# Patient Record
Sex: MALE | Race: BLACK OR AFRICAN AMERICAN | NOT HISPANIC OR LATINO | Employment: UNEMPLOYED | URBAN - METROPOLITAN AREA
[De-identification: names, ages, dates, MRNs, and addresses within clinical notes are randomized per-mention and may not be internally consistent; named-entity substitution may affect disease eponyms.]

---

## 2018-07-10 ENCOUNTER — OFFICE VISIT (OUTPATIENT)
Dept: INTERNAL MEDICINE | Facility: HOSPITAL | Age: 53
End: 2018-07-10
Attending: STUDENT IN AN ORGANIZED HEALTH CARE EDUCATION/TRAINING PROGRAM
Payer: COMMERCIAL

## 2018-07-10 ENCOUNTER — HOSPITAL ENCOUNTER (OUTPATIENT)
Dept: RADIOLOGY | Facility: HOSPITAL | Age: 53
Discharge: HOME | End: 2018-07-10
Attending: STUDENT IN AN ORGANIZED HEALTH CARE EDUCATION/TRAINING PROGRAM
Payer: COMMERCIAL

## 2018-07-10 VITALS
DIASTOLIC BLOOD PRESSURE: 87 MMHG | HEIGHT: 67 IN | RESPIRATION RATE: 18 BRPM | WEIGHT: 173 LBS | BODY MASS INDEX: 27.15 KG/M2 | SYSTOLIC BLOOD PRESSURE: 131 MMHG | TEMPERATURE: 98.7 F | HEART RATE: 86 BPM | OXYGEN SATURATION: 97 %

## 2018-07-10 DIAGNOSIS — M25.571 ACUTE RIGHT ANKLE PAIN: ICD-10-CM

## 2018-07-10 DIAGNOSIS — M25.571 ACUTE RIGHT ANKLE PAIN: Primary | ICD-10-CM

## 2018-07-10 PROBLEM — H40.9 GLAUCOMA: Status: ACTIVE | Noted: 2018-07-10

## 2018-07-10 PROBLEM — I10 ESSENTIAL HYPERTENSION: Status: ACTIVE | Noted: 2018-07-10

## 2018-07-10 PROBLEM — F10.10 ALCOHOL ABUSE: Status: ACTIVE | Noted: 2018-07-10

## 2018-07-10 PROBLEM — Z00.00 HEALTHCARE MAINTENANCE: Status: ACTIVE | Noted: 2018-07-10

## 2018-07-10 PROCEDURE — 73600 X-RAY EXAM OF ANKLE: CPT | Mod: RT

## 2018-07-10 PROCEDURE — 99213 OFFICE O/P EST LOW 20 MIN: CPT | Performed by: INTERNAL MEDICINE

## 2018-07-10 RX ORDER — AMLODIPINE BESYLATE 5 MG/1
5 TABLET ORAL DAILY
COMMUNITY
Start: 2016-11-15 | End: 2018-07-10 | Stop reason: ALTCHOICE

## 2018-07-10 NOTE — PROGRESS NOTES
"MS4 Notes:    CC: R ankle pain    HPI: The patient is a 52 year old male with a medical history of EtOH abuse and glaucoma presenting today with a 2 week history of R ankle pain and swelling.  The patient admits to rolling his ankle while walking on uneven ground followed by immediate pain and swelling.  The patient describes the pain as initially 10/10, decreasing in intensity as time passed.  The swelling has also decreased over the past couple of weeks.  He has tried rest/ice/elevation and epson salt baths with some relief but still admits to pain and inability to bear weight on the joint. He has not tried any pain medication.    PMHx: EtOH abuse, glaucoma  PSHx: none  Meds: none  NKDA  FHx: none disclosed   SocHx: - cigarette smoking; + EtOH use \"around 40ounces of beer per day\"; intermittent marajuana use    ROS: see HPI otherwise negative    PE: GEN: NAD  Cv: RRR, normal S1, S2; no murmurs rubs or ex sounds  PULM: CTAB  MSK: Swelling and tenderness of the R ankle most prominent in the area of the lateral malleolus; decreased ROM compared to L ankle.  Significantly limited ability to bear weight.  No warmth, erythema.    Assessment/ Plan:  The patient is a 52 year old male with a medical history of EtOH abuse and glaucoma presenting with a 2 week history of R ankle pain and swelling found to have point tenderness inferior to the lateral malleolus, decreased ROM and decreased ability to bear weight without complete improvement with Rest Ice and elevation concerning for a R ankle sprain vs. Fracture.    1) Ankle pain:   -- Continue rest/ice/elevation  -- wrap with ace bandage  -- Ibuprofen PRN  -- Ankle XR to rule out fracture    "

## 2018-07-10 NOTE — PROGRESS NOTES
[facility]  Internal Medicine Progress Note       SUBJECTIVE   Rickie Kaminski is a 52 y.o. year-old male with a past medical history of HTN, alcohol abuse who presents for a same day sick visit for a complaint of     Interval History:   -Patient was last seen in the [facility] on 8/2017 for a complaint of memory loss, labs including B12, folate, TSH were normal. Today, no such complaints.     He presents to the clinic today due to a complaint of right ankle pain, swelling since a rolling his ankle while walking on uneven ground 2 weeks ago. Pain 9/10 in intensity, has been getting better since it happened however still severely limiting his ability to walk and get around. He has tried rest, ice, and elevation as well as epsom salts with mild relief. Today, he comes into the office at his wife's insistence. Denies any pain on the other foot, in leg or any other joints.     ROS otherwise unremarkable.    Patient was also advised to make regular follow up appointment so that his chronic conditions can be discussed within the next 1-2 weeks.       REVIEW OF SYSTEMS   Review of Systems   Constitutional: Negative for activity change, appetite change, chills, fatigue, fever and unexpected weight change.   HENT: Negative for congestion, ear discharge, rhinorrhea, sinus pressure and sore throat.    Eyes: Negative for discharge, itching and visual disturbance.   Respiratory: Negative for cough, chest tightness, shortness of breath and wheezing.    Cardiovascular: Negative for chest pain, palpitations and leg swelling.   Gastrointestinal: Negative for abdominal distention, abdominal pain, blood in stool, constipation, diarrhea, nausea and vomiting.   Endocrine: Negative for cold intolerance, heat intolerance, polydipsia, polyphagia and polyuria.   Genitourinary: Negative for difficulty urinating, dysuria and hematuria.   Musculoskeletal: Positive for joint swelling. Negative for myalgias.        -Pain and  "swelling of right ankle   Skin: Negative for rash.   Neurological: Negative for weakness, light-headedness and headaches.   Hematological: Does not bruise/bleed easily.   Psychiatric/Behavioral: Negative for confusion.      MEDICATIONS      No current outpatient prescriptions on file.    PHYSICAL EXAMINATION   Vital signs: /87 (BP Location: Right upper arm, Patient Position: Sitting)   Pulse 86   Temp 37.1 °C (98.7 °F) (Oral)   Resp 18   Ht 1.702 m (5' 7\")   Wt 78.5 kg (173 lb)   SpO2 97%   BMI 27.10 kg/m²   Physical Exam   Constitutional: He is oriented to person, place, and time. He appears well-developed and well-nourished. No distress.   HENT:   Head: Normocephalic and atraumatic.   Eyes: EOM are normal.   Neck: Normal range of motion. No JVD present.   Cardiovascular: Normal rate, regular rhythm, normal heart sounds and intact distal pulses.  Exam reveals no gallop and no friction rub.    No murmur heard.  Pulmonary/Chest: Effort normal and breath sounds normal. No respiratory distress. He has no wheezes. He has no rales.   Abdominal: Soft. Bowel sounds are normal. He exhibits no distension. There is no tenderness.   Musculoskeletal: Normal range of motion. He exhibits no edema.   -Tender, erythematous right ankle, tenderness over medial and lateral malleolus. No skin breaks.   -Limited passive and active range of motion.    Neurological: He is alert and oriented to person, place, and time.   Skin: Skin is warm and dry.   Psychiatric: He has a normal mood and affect. His behavior is normal.        LABS / IMAGING/STUDIES      Labs Reviewed: done    Studies/Imaging Reviewed: done    ASSESSMENT AND PLAN      Problem List     Acute right ankle pain - Primary    Current Assessment & Plan     Patient presenting with a 2 week history of right ankle pain and swelling after rolling the foot over uneven ground.   -Marked tenderness, erythema, and swelling over ankle and particularly around malleoli with " limited passive and active range of motion.  -Advised patient on rest/ice/compression/elevation.   -Placed in ace bandage in office  -Will obtain x-ray to r/o fracture, in which case he will require a boot.   -Can take ibuprofen prn for pain, advised to take it with food.         Relevant Orders    X-RAY ANKLE RIGHT 2 VIEWS (Completed)           Kevin Pepper MD  07/11/18  3:07 PM

## 2018-07-10 NOTE — PATIENT INSTRUCTIONS
Dear Mr. Kaminski,     In clinic today, we discussed your right foot pain and swelling. You likely have a sprain in your ankle. However, since it has been present for 2 weeks and not improving, we would like to rule out an x-ray. Please get this done today. As well, you can take ibuprofen as needed for pain.    We wish you the best of health!    Sincerely,  [facility]

## 2018-07-10 NOTE — ASSESSMENT & PLAN NOTE
- screening colonoscopy 3/15/2017, 2 5x8mm rectosigmoid polyps resected (path: sessile serated polyp w/o dysplasia  and hyperplastic polyp) and multiple 2-4mm rectal polyps resected (path: fragments of hyperplastic polyps); needs  repeat c-scope in 5 years  - UTD with vaccinations  - was given script for TSH, A1c, FLP but these were not done  - denies current cigarette smoking but does admit to some marijuana use

## 2018-07-11 ENCOUNTER — TELEPHONE (OUTPATIENT)
Dept: INTERNAL MEDICINE | Facility: HOSPITAL | Age: 53
End: 2018-07-11

## 2018-07-11 ASSESSMENT — ENCOUNTER SYMPTOMS
CONFUSION: 0
CHEST TIGHTNESS: 0
WEAKNESS: 0
POLYDIPSIA: 0
MYALGIAS: 0
CHILLS: 0
LIGHT-HEADEDNESS: 0
PALPITATIONS: 0
CONSTIPATION: 0
NAUSEA: 0
ABDOMINAL PAIN: 0
COUGH: 0
BRUISES/BLEEDS EASILY: 0
POLYPHAGIA: 0
DIFFICULTY URINATING: 0
EYE DISCHARGE: 0
HEADACHES: 0
ABDOMINAL DISTENTION: 0
BLOOD IN STOOL: 0
WHEEZING: 0
JOINT SWELLING: 1
DYSURIA: 0
RHINORRHEA: 0
SHORTNESS OF BREATH: 0
FATIGUE: 0
SORE THROAT: 0
DIARRHEA: 0
HEMATURIA: 0
FEVER: 0
VOMITING: 0
APPETITE CHANGE: 0
SINUS PRESSURE: 0
ACTIVITY CHANGE: 0
EYE ITCHING: 0
UNEXPECTED WEIGHT CHANGE: 0

## 2018-07-11 NOTE — TELEPHONE ENCOUNTER
Called patient 3 times this afternoon to discuss results of ankle x-ray yesterday which now reveals an avulsion fracture of medial malleolus. Would like to place patient in a boot, and have him follow up in 3 weeks to monitor for resolution.     Left voicemail with instructions to call back so that this can be arranged. Will await call back and attempt to call again tomorrow.

## 2018-07-11 NOTE — ASSESSMENT & PLAN NOTE
Patient presenting with a 2 week history of right ankle pain and swelling after rolling the foot over uneven ground.   -Marked tenderness, erythema, and swelling over ankle and particularly around malleoli with limited passive and active range of motion.  -Advised patient on rest/ice/compression/elevation.   -Placed in ace bandage in office  -Will obtain x-ray to r/o fracture, in which case he will require a boot.   -Can take ibuprofen prn for pain, advised to take it with food.

## 2018-07-12 ENCOUNTER — TELEPHONE (OUTPATIENT)
Dept: INTERNAL MEDICINE | Facility: HOSPITAL | Age: 53
End: 2018-07-12

## 2018-07-12 DIAGNOSIS — S82.54XD CLOSED NONDISPLACED FRACTURE OF MEDIAL MALLEOLUS OF RIGHT TIBIA WITH ROUTINE HEALING: Primary | ICD-10-CM

## 2018-07-12 NOTE — TELEPHONE ENCOUNTER
Pt came in for ankle brace as recommended by Dr. Pepper. I evaluated him briefly in the office. Neurovascularly intact. TTP over medial malleolus. Mild swelling. Tried to fit short CAM boot, unable to bear weight. Advised orthopedic follow up, remain NWB for now. Online referral made to [facility].     Rebecca Blakely MD  2:50 PM

## 2018-07-24 NOTE — PROGRESS NOTES
I performed a history and physical examination of the patient and discussed the management with the Resident. I reviewed the Resident's note and agree with the documented findings and plan of care, except for my comments below or within the additional notes today.Recent right ankle injury, did not seek attention until now. Is tender to palpation. Likely sprain but check xray to rule out fracture.

## 2018-08-15 ENCOUNTER — OFFICE VISIT (OUTPATIENT)
Dept: INTERNAL MEDICINE | Facility: HOSPITAL | Age: 53
End: 2018-08-15
Attending: STUDENT IN AN ORGANIZED HEALTH CARE EDUCATION/TRAINING PROGRAM
Payer: COMMERCIAL

## 2018-08-15 VITALS
RESPIRATION RATE: 18 BRPM | TEMPERATURE: 97.7 F | HEIGHT: 67 IN | SYSTOLIC BLOOD PRESSURE: 136 MMHG | DIASTOLIC BLOOD PRESSURE: 93 MMHG | OXYGEN SATURATION: 98 % | WEIGHT: 170 LBS | BODY MASS INDEX: 26.68 KG/M2 | HEART RATE: 89 BPM

## 2018-08-15 DIAGNOSIS — I10 ESSENTIAL HYPERTENSION: Primary | ICD-10-CM

## 2018-08-15 DIAGNOSIS — Z00.00 HEALTHCARE MAINTENANCE: ICD-10-CM

## 2018-08-15 DIAGNOSIS — F10.10 ALCOHOL ABUSE: ICD-10-CM

## 2018-08-15 DIAGNOSIS — M25.571 ACUTE RIGHT ANKLE PAIN: ICD-10-CM

## 2018-08-15 DIAGNOSIS — R73.03 PREDIABETES: ICD-10-CM

## 2018-08-15 PROCEDURE — 99213 OFFICE O/P EST LOW 20 MIN: CPT | Performed by: FAMILY MEDICINE

## 2018-08-15 NOTE — PROGRESS NOTES
[facility]  Internal Medicine Progress Note       SUBJECTIVE   Rickie Kaminski is a 52 y.o. year-old male with a past medical history of HTN, alcohol abuse, glaucoma who presents for follow up.    Interval History:   - Patient was last seen in the [facility] on 7/10/2018 for acute right ankle pain which was found to be an avulsion fracture of the medial maleollus. He followed up with Orthopedic surgery at [facility] where he was placed in a boot.     - Today, he says that his foot is a lot better. He is now able to bear weight on it a little more. He is performing exercises at home.     - He complains today of occasional back spasms. They are not very bothersome to him and occur 1-2 times per week. No lower extremity weakness, numbness, tingling, no radicular pain. No red flag symptoms.     - Of note, patient has a hx of alcohol abuse, and continues to drink 1 40oz can daily of beer. He says he used to drink much more but has been trying to cut down lately. His wife, who is accompanying him to the clinic has been helping him cut down.     - ROS was unremarkable.   __  HCM:  - ASCVD: 8/2017 A1c 5.7%, 8/2017 lipid panel normal, nonsmoker  - Colonoscopy: due in 2022  - Immunizations UTD     REVIEW OF SYSTEMS   Review of Systems   Constitutional: Negative for activity change, appetite change, chills, fatigue, fever and unexpected weight change.   HENT: Negative for congestion, ear discharge, rhinorrhea, sinus pressure and sore throat.    Eyes: Negative for discharge, itching and visual disturbance.   Respiratory: Negative for cough, chest tightness, shortness of breath and wheezing.    Cardiovascular: Negative for chest pain, palpitations and leg swelling.   Gastrointestinal: Negative for abdominal distention, abdominal pain, blood in stool, constipation, diarrhea, nausea and vomiting.   Endocrine: Negative for cold intolerance, heat intolerance, polydipsia, polyphagia and polyuria.   Genitourinary: Negative  "for difficulty urinating, dysuria and hematuria.   Musculoskeletal: Negative for joint swelling and myalgias.        Improving right ankle pain   Skin: Negative for rash.   Neurological: Negative for weakness, light-headedness and headaches.   Hematological: Does not bruise/bleed easily.   Psychiatric/Behavioral: Negative for confusion.        MEDICATIONS      No current outpatient prescriptions on file.    PHYSICAL EXAMINATION   Vital signs: BP (!) 136/93   Pulse 89   Temp 36.5 °C (97.7 °F) (Oral)   Resp 18   Ht 1.702 m (5' 7\")   Wt 77.1 kg (170 lb)   SpO2 98%   BMI 26.63 kg/m²   Physical Exam   Constitutional: He is oriented to person, place, and time. He appears well-developed and well-nourished. No distress.   HENT:   Head: Normocephalic and atraumatic.   Eyes: EOM are normal.   Neck: Normal range of motion. No JVD present.   Cardiovascular: Normal rate, regular rhythm, normal heart sounds and intact distal pulses.  Exam reveals no gallop and no friction rub.    No murmur heard.  Pulmonary/Chest: Effort normal and breath sounds normal. No respiratory distress. He has no wheezes. He has no rales.   Abdominal: Soft. Bowel sounds are normal. He exhibits no distension. There is no tenderness.   Musculoskeletal: Normal range of motion. He exhibits no edema.   Neurological: He is alert and oriented to person, place, and time.   Skin: Skin is warm and dry.   Psychiatric: He has a normal mood and affect. His behavior is normal.        LABS / IMAGING/STUDIES      Labs Reviewed: done    Studies/Imaging Reviewed: done    ASSESSMENT AND PLAN      Problem List Items Addressed This Visit     Essential hypertension - Primary    Current Assessment & Plan     Patient with a hx of HTN, however lately has been normotensive without need for medications.          Alcohol abuse    Current Assessment & Plan     Patient with a hx of alcohol abuse, currently drinks 1 40oz can of beer, used to drink much more per patient.   - Per " patient and wife, they have been working on cutting down the alcohol. Patient was counseled again on trying to cut down, and he is in agreement.   - Will check CMP, CBC, and INR to ensure liver synthetic function intact and normal.          Relevant Orders    Comprehensive metabolic panel (Completed)    CBC (Completed)    Protime-INR (Completed)    Healthcare maintenance    Current Assessment & Plan     - ASCVD: 8/2017 A1c 5.7%, 8/2017 lipid panel normal, nonsmoker  - Colonoscopy: due in 2022  - Immunizations UTD         Acute right ankle pain    Current Assessment & Plan     Patient had avulsion fracture of medial malleolus in 7/2018, followed with Orthopedic surgery at [facility].   - He was in a CAM boot for a few weeks, and says he is now doing much better. He is able to bear weight on his foot and able to walk around without a cane. He is continuing his exercises at home.          Prediabetes    Current Assessment & Plan     Patient with prediabetes with A1c 5.7% in 8/2017  - Will recheck A1c today.  - Patient counseled on importance of diet and exercise.          Relevant Orders    Hemoglobin A1c (Completed)           Kevin Pepper MD  08/16/18  8:59 AM

## 2018-08-15 NOTE — PATIENT INSTRUCTIONS
Dear Mr. Kaminski,     In clinic today, we discussed your ankle, which is now improving and you are able to bear weight on it. Please continue to monitor it for improvement and do exercises. As well, we discussed cutting down on alcohol. Please implement this. Please also have the blood work done today to ensure that the alcohol is not damaging your liver.     We wish you the best of health!    Sincerely,  [facility]

## 2018-08-16 LAB
ALBUMIN SERPL-MCNC: 4.1 G/DL (ref 3.5–5.5)
ALBUMIN/GLOB SERPL: 1.1 {RATIO} (ref 1.2–2.2)
ALP SERPL-CCNC: 89 IU/L (ref 39–117)
ALT SERPL-CCNC: 17 IU/L (ref 0–44)
AST SERPL-CCNC: 28 IU/L (ref 0–40)
BILIRUB SERPL-MCNC: 0.4 MG/DL (ref 0–1.2)
BUN SERPL-MCNC: 5 MG/DL (ref 6–24)
BUN/CREAT SERPL: 4 (ref 9–20)
CALCIUM SERPL-MCNC: 9.3 MG/DL (ref 8.7–10.2)
CHLORIDE SERPL-SCNC: 99 MMOL/L (ref 96–106)
CO2 SERPL-SCNC: 25 MMOL/L (ref 20–29)
CREAT SERPL-MCNC: 1.13 MG/DL (ref 0.76–1.27)
ERYTHROCYTE [DISTWIDTH] IN BLOOD BY AUTOMATED COUNT: 15.7 % (ref 12.3–15.4)
GLOBULIN SER CALC-MCNC: 3.8 G/DL (ref 1.5–4.5)
GLUCOSE SERPL-MCNC: 90 MG/DL (ref 65–99)
HBA1C MFR BLD: 5.4 % (ref 4.8–5.6)
HCT VFR BLD AUTO: 44.6 % (ref 37.5–51)
HGB BLD-MCNC: 14.7 G/DL (ref 13–17.7)
INR PPP: 1.1 (ref 0.8–1.2)
LAB CORP EGFR IF AFRICN AM: 85 ML/MIN/1.73
LAB CORP EGFR IF NONAFRICN AM: 73 ML/MIN/1.73
MCH RBC QN AUTO: 23.8 PG (ref 26.6–33)
MCHC RBC AUTO-ENTMCNC: 33 G/DL (ref 31.5–35.7)
MCV RBC AUTO: 72 FL (ref 79–97)
PLATELET # BLD AUTO: 245 X10E3/UL (ref 150–379)
POTASSIUM SERPL-SCNC: 4.3 MMOL/L (ref 3.5–5.2)
PROT SERPL-MCNC: 7.9 G/DL (ref 6–8.5)
PROTHROMBIN TIME: 11.2 SEC (ref 9.1–12)
RBC # BLD AUTO: 6.17 X10E6/UL (ref 4.14–5.8)
SODIUM SERPL-SCNC: 141 MMOL/L (ref 134–144)
WBC # BLD AUTO: 4.1 X10E3/UL (ref 3.4–10.8)

## 2018-08-16 ASSESSMENT — ENCOUNTER SYMPTOMS
SHORTNESS OF BREATH: 0
ACTIVITY CHANGE: 0
CHEST TIGHTNESS: 0
EYE DISCHARGE: 0
MYALGIAS: 0
RHINORRHEA: 0
CONSTIPATION: 0
SORE THROAT: 0
EYE ITCHING: 0
ABDOMINAL PAIN: 0
POLYDIPSIA: 0
VOMITING: 0
SINUS PRESSURE: 0
CONFUSION: 0
FATIGUE: 0
APPETITE CHANGE: 0
HEADACHES: 0
JOINT SWELLING: 0
DYSURIA: 0
CHILLS: 0
NAUSEA: 0
COUGH: 0
UNEXPECTED WEIGHT CHANGE: 0
DIARRHEA: 0
BRUISES/BLEEDS EASILY: 0
BLOOD IN STOOL: 0
LIGHT-HEADEDNESS: 0
HEMATURIA: 0
ABDOMINAL DISTENTION: 0
WEAKNESS: 0
PALPITATIONS: 0
FEVER: 0
DIFFICULTY URINATING: 0
POLYPHAGIA: 0
WHEEZING: 0

## 2018-08-16 NOTE — ASSESSMENT & PLAN NOTE
Patient with prediabetes with A1c 5.7% in 8/2017  - Will recheck A1c today.  - Patient counseled on importance of diet and exercise.

## 2018-08-16 NOTE — ASSESSMENT & PLAN NOTE
- ASCVD: 8/2017 A1c 5.7%, 8/2017 lipid panel normal, nonsmoker  - Colonoscopy: due in 2022  - Immunizations UTD

## 2018-08-16 NOTE — ASSESSMENT & PLAN NOTE
Patient had avulsion fracture of medial malleolus in 7/2018, followed with Orthopedic surgery at [facility].   - He was in a CAM boot for a few weeks, and says he is now doing much better. He is able to bear weight on his foot and able to walk around without a cane. He is continuing his exercises at home.

## 2018-08-16 NOTE — PROGRESS NOTES
I have discussed the patient's care with the Resident. I have reviewed the patient's history and Resident's findings on exam, the patient's diagnosis/differential diagnosis and treatment plan. I concur with the treatment plan as documented by the Resident, except for my comments below or within additional notes today.

## 2018-08-16 NOTE — ASSESSMENT & PLAN NOTE
Patient with a hx of alcohol abuse, currently drinks 1 40oz can of beer, used to drink much more per patient.   - Per patient and wife, they have been working on cutting down the alcohol. Patient was counseled again on trying to cut down, and he is in agreement.   - Will check CMP, CBC, and INR to ensure liver synthetic function intact and normal.

## 2020-01-17 ENCOUNTER — OFFICE VISIT (OUTPATIENT)
Dept: INTERNAL MEDICINE | Facility: HOSPITAL | Age: 55
End: 2020-01-17
Attending: STUDENT IN AN ORGANIZED HEALTH CARE EDUCATION/TRAINING PROGRAM
Payer: COMMERCIAL

## 2020-01-17 VITALS
DIASTOLIC BLOOD PRESSURE: 65 MMHG | HEIGHT: 67 IN | BODY MASS INDEX: 25.11 KG/M2 | TEMPERATURE: 98 F | OXYGEN SATURATION: 97 % | SYSTOLIC BLOOD PRESSURE: 115 MMHG | WEIGHT: 160 LBS | HEART RATE: 97 BPM

## 2020-01-17 DIAGNOSIS — I10 ESSENTIAL HYPERTENSION: Primary | ICD-10-CM

## 2020-01-17 DIAGNOSIS — H40.9 GLAUCOMA, UNSPECIFIED GLAUCOMA TYPE, UNSPECIFIED LATERALITY: ICD-10-CM

## 2020-01-17 DIAGNOSIS — F10.10 ALCOHOL ABUSE: ICD-10-CM

## 2020-01-17 PROCEDURE — 99213 OFFICE O/P EST LOW 20 MIN: CPT | Performed by: STUDENT IN AN ORGANIZED HEALTH CARE EDUCATION/TRAINING PROGRAM

## 2020-01-17 RX ORDER — FOLIC ACID 1 MG/1
1 TABLET ORAL DAILY
Qty: 90 TABLET | Refills: 1 | Status: SHIPPED | OUTPATIENT
Start: 2020-01-17 | End: 2020-10-20 | Stop reason: SDUPTHER

## 2020-01-17 RX ORDER — LANOLIN ALCOHOL/MO/W.PET/CERES
100 CREAM (GRAM) TOPICAL DAILY
Qty: 90 TABLET | Refills: 3 | Status: SHIPPED | OUTPATIENT
Start: 2020-01-17 | End: 2020-10-20 | Stop reason: SDUPTHER

## 2020-01-17 ASSESSMENT — ENCOUNTER SYMPTOMS
DIARRHEA: 0
CHILLS: 0
EYE ITCHING: 0
POLYPHAGIA: 0
SINUS PRESSURE: 0
EYE DISCHARGE: 0
COUGH: 0
VOMITING: 0
POLYDIPSIA: 0
CONFUSION: 0
WHEEZING: 0
NAUSEA: 0
BRUISES/BLEEDS EASILY: 0
HEMATURIA: 0
SHORTNESS OF BREATH: 0
LIGHT-HEADEDNESS: 0
FATIGUE: 0
PALPITATIONS: 0
MYALGIAS: 0
BLOOD IN STOOL: 0
RHINORRHEA: 0
ABDOMINAL DISTENTION: 0
WEAKNESS: 0
FEVER: 0
APPETITE CHANGE: 0
SORE THROAT: 0
ABDOMINAL PAIN: 0
DIFFICULTY URINATING: 0
UNEXPECTED WEIGHT CHANGE: 0
HEADACHES: 0
JOINT SWELLING: 0
ACTIVITY CHANGE: 0
CHEST TIGHTNESS: 0
DYSURIA: 0
CONSTIPATION: 0

## 2020-01-17 ASSESSMENT — PAIN SCALES - GENERAL: PAINLEVEL: 0-NO PAIN

## 2020-01-17 NOTE — PROGRESS NOTES
[facility]  Internal Medicine Progress Note       SUBJECTIVE   Rickie Kaminski is a 54 y.o. year-old male with a past medical history of alcohol abuse with a history of being in rehabilitation 2011, glaucoma who presents for follow up.    Patient was last seen in the [facility] on 8/15/2018.  He has been doing well since that time.  He reports no complaints today.  He is accompanied to the office by his wife who provides additional history.    Most of the visit was spent discussing the patient's alcohol use.  Patient has a long history of alcohol abuse and has been in rehab twice in the past with the last time being in 2011.  He used to drink up to half gallon of vodka a day per patient report.  He has now cut down to 1-2 40 ounce cans of beer daily.  He is 3 out of 4 positive on the CAGE questionnaire.  He does want to quit however is not interested in resources by us at the time.  He will look into going to Alcoholics Anonymous meetings near his home.  __  HCM:  - ASCVD: 8/2017 A1c 5.7%, 8/2017 lipid panel normal, nonsmoker  - Colonoscopy: due in 2022  - Immunizations: UTD     REVIEW OF SYSTEMS   Review of Systems   Constitutional: Negative for activity change, appetite change, chills, fatigue, fever and unexpected weight change.   HENT: Negative for congestion, ear discharge, rhinorrhea, sinus pressure and sore throat.    Eyes: Negative for discharge, itching and visual disturbance.   Respiratory: Negative for cough, chest tightness, shortness of breath and wheezing.    Cardiovascular: Negative for chest pain, palpitations and leg swelling.   Gastrointestinal: Negative for abdominal distention, abdominal pain, blood in stool, constipation, diarrhea, nausea and vomiting.   Endocrine: Negative for cold intolerance, heat intolerance, polydipsia, polyphagia and polyuria.   Genitourinary: Negative for difficulty urinating, dysuria and hematuria.   Musculoskeletal: Negative for joint swelling and  "myalgias.   Skin: Negative for rash.   Neurological: Negative for weakness, light-headedness and headaches.   Hematological: Does not bruise/bleed easily.   Psychiatric/Behavioral: Negative for confusion.      MEDICATIONS        Current Outpatient Medications:   •  folic acid (FOLVITE) 1 mg tablet, Take 1 tablet (1 mg total) by mouth daily., Disp: 90 tablet, Rfl: 1  •  thiamine 100 mg tablet, Take 1 tablet (100 mg total) by mouth daily., Disp: 90 tablet, Rfl: 3    PHYSICAL EXAMINATION   Vital signs:   Visit Vitals  /65 (BP Location: Right upper arm, Patient Position: Sitting)   Pulse 97   Temp 36.7 °C (98 °F) (Oral)   Ht 1.702 m (5' 7\")   Wt 72.6 kg (160 lb)   SpO2 97%   BMI 25.06 kg/m²     Physical Exam   Constitutional: He is oriented to person, place, and time. He appears well-developed and well-nourished. No distress.   HENT:   Head: Normocephalic and atraumatic.   Eyes: EOM are normal.   Neck: Normal range of motion. No JVD present.   Cardiovascular: Normal rate, regular rhythm, normal heart sounds and intact distal pulses. Exam reveals no gallop and no friction rub.   No murmur heard.  Pulmonary/Chest: Effort normal and breath sounds normal. No respiratory distress. He has no wheezes. He has no rales.   Abdominal: Soft. Bowel sounds are normal. He exhibits no distension. There is no tenderness.   Musculoskeletal: Normal range of motion. He exhibits no edema.   Neurological: He is alert and oriented to person, place, and time.   Skin: Skin is warm and dry.   Psychiatric: He has a normal mood and affect. His behavior is normal.      LABS / IMAGING/STUDIES      Labs Reviewed:   Last BMP:  Lab Results   Component Value Date     08/15/2018    K 4.3 08/15/2018    CL 99 08/15/2018    CO2 25 08/15/2018    BUN 5 (L) 08/15/2018    CREATININE 1.13 08/15/2018       Last CBC:  Lab Results   Component Value Date    WBC 4.1 08/15/2018    HGB 14.7 08/15/2018    HCT 44.6 08/15/2018    MCV 72 (L) 08/15/2018    PLT " 245 08/15/2018       Last Lipids:  Lab Results   Component Value Date    CHOL 196 08/12/2017    HDL 87 08/12/2017    LDLCALC 85 08/12/2017    TRIG 122 08/12/2017       Last three HgbA1c:  Lab Results   Component Value Date    HGBA1C 5.4 08/15/2018    HGBA1C 5.7 (H) 08/12/2017       Last Microalbumin:  No results found for: MICROALBUR    Last TSH:  Lab Results   Component Value Date    TSH 2.060 08/12/2017       Last Vitamin D:  No results found for: [id number]    Studies/Imaging Reviewed: reviewed    ASSESSMENT AND PLAN      Problem List Items Addressed This Visit        Nervous    Alcohol abuse     History of alcohol abuse status post rehab twice, last time 2011.  Reports drinking up to half gallon of vodka daily, now drinks 1-2 40 ounce cans of beer daily.  -He is 3 out of 4 positive on the CAGE questionnaire.  - He was counseled extensively on quitting alcohol further and did express that he did want to do so however declined resources at this time.  He will look into Alcoholics Anonymous meetings near his home.  -We will obtain CMP and INR to assess liver function.         Relevant Medications    thiamine 100 mg tablet    folic acid (FOLVITE) 1 mg tablet    Other Relevant Orders    Comprehensive metabolic panel    Protime-INR    Glaucoma     Patient has a history of glaucoma and reports that he used to follow with an ophthalmologist but now needs a new referral.  His glaucoma has been stable.  -Referral provided.         Relevant Orders    Ambulatory referral to Ophthalmology       Circulatory    RESOLVED: Essential hypertension - Primary           HEALTHCARE MAINTENANCE   Health Maintenance Due   Topic Date Due   • HIV Screening  09[birth year]09/1978   • Zoster Vaccine (1 of 2) 09/09/2013          Kevin Pepper MD  01/17/20  12:03 PM

## 2020-01-17 NOTE — ASSESSMENT & PLAN NOTE
History of alcohol abuse status post rehab twice, last time 2011.  Reports drinking up to half gallon of vodka daily, now drinks 1-2 40 ounce cans of beer daily.  -He is 3 out of 4 positive on the CAGE questionnaire.  - He was counseled extensively on quitting alcohol further and did express that he did want to do so however declined resources at this time.  He will look into Alcoholics Anonymous meetings near his home.  -We will obtain CMP and INR to assess liver function.

## 2020-01-17 NOTE — ASSESSMENT & PLAN NOTE
Patient has a history of glaucoma and reports that he used to follow with an ophthalmologist but now needs a new referral.  His glaucoma has been stable.  -Referral provided.

## 2020-01-17 NOTE — PROGRESS NOTES
"     [facility]  Internal Medicine Progress Note       BRIEF ATTENDING DOCUMENTATION   Please see attestation section of note for attestation and any additional physician documentation not found here.    Type of faculty precepting: Exception     HPI: This is a 54 year old male with pmh of alcohol abuse, and glaucoma, he was seen last spring, lfts/inr etc were normal. He continues to drink 2-3 40oz of beer daily, he is 3/4 on CAGE. He does not want to speak with SW today. He \"knows all about this\".   UTD with flu from pharmacy.     AP  1. Alcohol use disorder-continues to consume alcohol, and is increasing over time which is concerning, Dr. Pepper expressed this concerned, let him know that we have a SW who can help with resources, and that we recommend going to an AA meeting to get started today. Add thiamine and folate.    2. Fatty Liver- 2015 US with fatty liver likely in setting of alcohol use, CMP/INR added today. Could consider repeat US. Hep C negative.     3.Glaucoma- continue optho f/u    4.HCM- HIV today, defer shingles until next visit. UTD otherwise.     Angel Rodriguez DO          "

## 2020-01-17 NOTE — PATIENT INSTRUCTIONS
1. Please get the lab work done today.   2. Please start taking your medications, thiamine and folate.

## 2020-01-18 LAB
ALBUMIN SERPL-MCNC: 4.3 G/DL (ref 3.5–5.5)
ALBUMIN/GLOB SERPL: 1.3 {RATIO} (ref 1.2–2.2)
ALP SERPL-CCNC: 75 IU/L (ref 39–117)
ALT SERPL-CCNC: 31 IU/L (ref 0–44)
AST SERPL-CCNC: 36 IU/L (ref 0–40)
BILIRUB SERPL-MCNC: 0.2 MG/DL (ref 0–1.2)
BUN SERPL-MCNC: 7 MG/DL (ref 6–24)
BUN/CREAT SERPL: 7 (ref 9–20)
CALCIUM SERPL-MCNC: 9.4 MG/DL (ref 8.7–10.2)
CHLORIDE SERPL-SCNC: 98 MMOL/L (ref 96–106)
CO2 SERPL-SCNC: 22 MMOL/L (ref 20–29)
CREAT SERPL-MCNC: 0.94 MG/DL (ref 0.76–1.27)
GLOBULIN SER CALC-MCNC: 3.4 G/DL (ref 1.5–4.5)
GLUCOSE SERPL-MCNC: 100 MG/DL (ref 65–99)
INR PPP: 1 (ref 0.8–1.2)
LAB CORP EGFR IF AFRICN AM: 104 ML/MIN/1.73
LAB CORP EGFR IF NONAFRICN AM: 90 ML/MIN/1.73
POTASSIUM SERPL-SCNC: 3.9 MMOL/L (ref 3.5–5.2)
PROT SERPL-MCNC: 7.7 G/DL (ref 6–8.5)
PROTHROMBIN TIME: 10.4 SEC (ref 9.1–12)
SODIUM SERPL-SCNC: 140 MMOL/L (ref 134–144)

## 2020-02-25 ENCOUNTER — TELEPHONE (OUTPATIENT)
Dept: INTERNAL MEDICINE | Facility: HOSPITAL | Age: 55
End: 2020-02-25

## 2020-02-25 NOTE — TELEPHONE ENCOUNTER
Patient's wife Ina is concerned about patient's memory loss and constant conversation repetition. Ina states this has been going on for a few years. She would like his memory issues to be addressed and a neurological check.

## 2020-10-20 ENCOUNTER — OFFICE VISIT (OUTPATIENT)
Dept: INTERNAL MEDICINE | Facility: HOSPITAL | Age: 55
End: 2020-10-20
Attending: STUDENT IN AN ORGANIZED HEALTH CARE EDUCATION/TRAINING PROGRAM
Payer: COMMERCIAL

## 2020-10-20 VITALS
WEIGHT: 168 LBS | DIASTOLIC BLOOD PRESSURE: 91 MMHG | HEIGHT: 67 IN | BODY MASS INDEX: 26.37 KG/M2 | OXYGEN SATURATION: 100 % | RESPIRATION RATE: 20 BRPM | SYSTOLIC BLOOD PRESSURE: 144 MMHG | TEMPERATURE: 98.5 F | HEART RATE: 96 BPM

## 2020-10-20 DIAGNOSIS — Z78.9 ALCOHOL USE: ICD-10-CM

## 2020-10-20 DIAGNOSIS — R73.03 PREDIABETES: Primary | ICD-10-CM

## 2020-10-20 DIAGNOSIS — Z00.00 HEALTHCARE MAINTENANCE: ICD-10-CM

## 2020-10-20 DIAGNOSIS — F10.10 ALCOHOL ABUSE: ICD-10-CM

## 2020-10-20 DIAGNOSIS — N28.1 RENAL CYST: ICD-10-CM

## 2020-10-20 DIAGNOSIS — G89.29 CHRONIC MIDLINE LOW BACK PAIN WITHOUT SCIATICA: ICD-10-CM

## 2020-10-20 DIAGNOSIS — M54.50 CHRONIC MIDLINE LOW BACK PAIN WITHOUT SCIATICA: ICD-10-CM

## 2020-10-20 DIAGNOSIS — H40.9 GLAUCOMA, UNSPECIFIED GLAUCOMA TYPE, UNSPECIFIED LATERALITY: ICD-10-CM

## 2020-10-20 PROBLEM — F10.90 ALCOHOL USE: Status: ACTIVE | Noted: 2018-07-10

## 2020-10-20 PROBLEM — R41.3 MEMORY LOSS: Status: ACTIVE | Noted: 2020-10-20

## 2020-10-20 PROBLEM — R41.3 MEMORY DIFFICULTIES: Status: RESOLVED | Noted: 2020-10-20 | Resolved: 2020-10-20

## 2020-10-20 PROBLEM — R41.3 MEMORY DIFFICULTIES: Status: ACTIVE | Noted: 2020-10-20

## 2020-10-20 PROBLEM — M25.571 ACUTE RIGHT ANKLE PAIN: Status: RESOLVED | Noted: 2018-07-10 | Resolved: 2020-10-20

## 2020-10-20 PROBLEM — R03.0 ELEVATED BLOOD PRESSURE READING: Status: ACTIVE | Noted: 2020-10-20

## 2020-10-20 PROCEDURE — 99213 OFFICE O/P EST LOW 20 MIN: CPT | Mod: GE,GC,25 | Performed by: STUDENT IN AN ORGANIZED HEALTH CARE EDUCATION/TRAINING PROGRAM

## 2020-10-20 PROCEDURE — 90686 IIV4 VACC NO PRSV 0.5 ML IM: CPT | Performed by: STUDENT IN AN ORGANIZED HEALTH CARE EDUCATION/TRAINING PROGRAM

## 2020-10-20 PROCEDURE — 3E0234Z INTRODUCTION OF SERUM, TOXOID AND VACCINE INTO MUSCLE, PERCUTANEOUS APPROACH: ICD-10-PCS | Performed by: INTERNAL MEDICINE

## 2020-10-20 PROCEDURE — 90472 IMMUNIZATION ADMIN EACH ADD: CPT | Mod: GC | Performed by: STUDENT IN AN ORGANIZED HEALTH CARE EDUCATION/TRAINING PROGRAM

## 2020-10-20 RX ORDER — LANOLIN ALCOHOL/MO/W.PET/CERES
100 CREAM (GRAM) TOPICAL DAILY
Qty: 90 TABLET | Refills: 3 | Status: SHIPPED | OUTPATIENT
Start: 2020-10-20 | End: 2021-03-18 | Stop reason: SDUPTHER

## 2020-10-20 RX ORDER — FOLIC ACID 1 MG/1
1 TABLET ORAL DAILY
Qty: 90 TABLET | Refills: 3 | Status: SHIPPED | OUTPATIENT
Start: 2020-10-20 | End: 2021-03-18 | Stop reason: SDUPTHER

## 2020-10-20 ASSESSMENT — PAIN SCALES - GENERAL: PAINLEVEL: 0-NO PAIN

## 2020-10-20 NOTE — PROGRESS NOTES
"56 yo male with hx of alcohol use and alcoholic fatty liver and glaucoma.  Last January had discussion about alchol use.      Visit Vitals  BP (!) 144/91   Pulse 96   Temp 36.9 °C (98.5 °F) (Oral)   Resp 20   Ht 1.702 m (5' 7\")   Wt 76.2 kg (168 lb)   SpO2 100%   BMI 26.31 kg/m²     /81      A/P   Alcohol Use:  4 beers a day.  This has been cut back significantly.  Had DT 5 yrs ago.  Has tried AA and is not interested in cutting back at this time.  Will give Vitamin suppliemenation    Short Term Memory Loss;  Probably related to ETOH.  Will check B12 folate, RPR    Renal Cyst: Repeat renal US to moditor the cyst    Lumbago: No red flags.  PT consult placed.     HCM: All immunizations UTD except Flu and Shingles today [place] UTD  "

## 2020-10-20 NOTE — ASSESSMENT & PLAN NOTE
Most consistent with musculoskeletal strain. No red flags. Patient can take an NSAID no more than 10 times monthly for the pain. I have give him a script for PT to help strengthen his posterior muscles.

## 2020-10-20 NOTE — PATIENT INSTRUCTIONS
Mr. Kaminski,    1. We gave you Shingles and Flu vaccine    2. I have sent over 3 vitamins to take. These get depleted when you drink alcohol frequently.    3. We will check some labs to make sure your memory issues aren't related to anything obvious.    4. Let me know if you are interested in cutting back on alcohol use    5. I have given you a referral to ophthalmology    6. We will watch your blood pressure. It is okay at this time.    We will see you back in 1 year  Dr. De La Vega

## 2020-10-20 NOTE — PROGRESS NOTES
[facility]  Internal Medicine Progress Note       SUBJECTIVE   Rickie Kaminski is a 55 y.o. year-old male with a past medical history of alcohol use c/b alcoholic fatty liver disease who presents for follow-up. He was last seen on 1/17/2020 by Dr. Pepper at which time there was a long discussion about alcohol use.  Today he presents with his wife.    Elevated blood pressure  Has had 1 previous elevated BP reading in our system on 8/5/2018 of 136/84. Has a family history. Today manual BP is 131/84. Asx    Memory  Wife has noticed that for the past few years that he has had some issues short term memory loss. She feels like it is multiple times daily. Patient never forgets where he is, who is around him, or what he is doing.    Eye  [facility] but it has been quite some time. Vision has remained steady over the past 1-2 years. They would like a referral to the eye doctor.     Back Pain  Has centrally located lower back pain that is intermittent. Estimates that it is once a month but wife estimates that it is more often. When it does occur it may happen for a couple days. No leg or buttock pain. Does not currently currently work. Does not do manual labor or lift heavy objects. Denies injuries to his back. No exacerbating factors. He does not take any medications for the pain. When the pain presents, it slows him down. Has not seen a physician before for back pain.     Weight   Mildly overweight.     Alcohol use  Drinks about a 40 a day estimated to about 4 beers daily. Wife concurs. Has a history of alcohol withdrawal 5 years ago with DTs. At that time he was drinking a quarter gallon of liquor daily.     HCM  Flu shot  Shingles vaccine  PPSV 23 2016  Colonoscopy 3/5/2017     REVIEW OF SYSTEMS   Review of Systems  As per HPI   MEDICATIONS        Current Outpatient Medications:   •  folic acid (FOLVITE) 1 mg tablet, Take 1 tablet (1 mg total)  "by mouth daily., Disp: 90 tablet, Rfl: 3  •  therapeutic multivitamin (THERAGRAN) tablet, Take 1 tablet by mouth daily., Disp: 90 tablet, Rfl: 3  •  thiamine 100 mg tablet, Take 1 tablet (100 mg total) by mouth daily., Disp: 90 tablet, Rfl: 3    PHYSICAL EXAMINATION   Vital signs:   Visit Vitals  BP (!) 144/91   Pulse 96   Temp 36.9 °C (98.5 °F) (Oral)   Resp 20   Ht 1.702 m (5' 7\")   Wt 76.2 kg (168 lb)   SpO2 100%   BMI 26.31 kg/m²     Physical Exam  Vitals signs reviewed.   Constitutional:       General: He is not in acute distress.     Appearance: He is well-developed. He is not diaphoretic.   HENT:      Head: Normocephalic and atraumatic.      Nose: Nose normal.   Eyes:      General: No scleral icterus.        Right eye: No discharge.         Left eye: No discharge.   Neck:      Musculoskeletal: Neck supple.   Cardiovascular:      Rate and Rhythm: Normal rate and regular rhythm.      Heart sounds: Normal heart sounds. No murmur. No friction rub. No gallop.    Pulmonary:      Effort: Pulmonary effort is normal. No respiratory distress.      Breath sounds: Normal breath sounds. No wheezing or rales.   Abdominal:      General: Bowel sounds are normal.      Palpations: Abdomen is soft.   Musculoskeletal: Normal range of motion.   Skin:     General: Skin is warm and dry.   Neurological:      Mental Status: He is alert and oriented to person, place, and time.   Psychiatric:         Behavior: Behavior normal.         Thought Content: Thought content normal.         Judgment: Judgment normal.          LABS / IMAGING/STUDIES      Labs Reviewed:   Last BMP:  Lab Results   Component Value Date     01/17/2020    K 3.9 01/17/2020    CL 98 01/17/2020    CO2 22 01/17/2020    BUN 7 01/17/2020    CREATININE 0.94 01/17/2020       Last CBC:  Lab Results   Component Value Date    WBC 4.1 08/15/2018    HGB 14.7 08/15/2018    HCT 44.6 08/15/2018    MCV 72 (L) 08/15/2018     08/15/2018       Last Lipids:  Lab Results "   Component Value Date    CHOL 196 08/12/2017    HDL 87 08/12/2017    LDLCALC 85 08/12/2017    TRIG 122 08/12/2017       Last three HgbA1c:  Lab Results   Component Value Date    HGBA1C 5.4 08/15/2018    HGBA1C 5.7 (H) 08/12/2017       Last Microalbumin:  No results found for: MICROALBUR    Last TSH:  Lab Results   Component Value Date    TSH 2.060 08/12/2017       Last Vitamin D:  No results found for: [id number]    Studies/Imaging Reviewed: Reviewed        ASSESSMENT AND PLAN      Problem List Items Addressed This Visit        Nervous    Glaucoma     I have given him a [facility]         Relevant Orders    Ambulatory referral to Ophthalmology    Chronic low back pain     Most consistent with musculoskeletal strain. No red flags. Patient can take an NSAID no more than 10 times monthly for the pain. I have give him a script for PT to help strengthen his posterior muscles.         Relevant Orders    Ambulatory referral to Physical Therapy       Genitourinary    Renal cyst     Incidental ~1.5cm solitary renal cyst. Repeat US for monitoring.            Endocrine/Metabolic    Prediabetes - Primary    Relevant Orders    Lipid panel    Hemoglobin A1c       Other    Alcohol use     Educated Mr. Kaminski about the negative effects of alcohol. He is not interested in reducing his intake at this time. I have let him know whenever he feels ready to do so, he can give our clinic a call for help. I have sent folate, thiamine, and MVI to his pharmacy for supplementation. Given history of AFLD, we will check a CMP         Relevant Medications    folic acid (FOLVITE) 1 mg tablet    thiamine 100 mg tablet    Other Relevant Orders    Comprehensive metabolic panel    Vitamin B12    TSH w reflex FT4    RPR    Healthcare maintenance     Patient given Shingrex and flu vaccine           Other Visit Diagnoses     Alcohol abuse        Relevant Medications    folic acid (FOLVITE) 1 mg tablet    thiamine 100 mg tablet            HEALTHCARE MAINTENANCE   Health Maintenance Due   Topic Date Due   • Varicella Vaccines (1 of 2 - 2-dose childhood series) 09/09/1964   • HIV Screening  09/09/1976   • Zoster Vaccine (1 of 2) 09/09/2013   • Influenza Vaccine (1) 08/01/2020          Rehana De La Vega MD  10/20/20  3:47 PM

## 2020-10-20 NOTE — ASSESSMENT & PLAN NOTE
Educated Mr. Kaminski about the negative effects of alcohol. He is not interested in reducing his intake at this time. I have let him know whenever he feels ready to do so, he can give our clinic a call for help. I have sent folate, thiamine, and MVI to his pharmacy for supplementation. Given history of AFLD, we will check a CMP

## 2020-10-20 NOTE — ASSESSMENT & PLAN NOTE
Wife reports short term memory issues. No long term issues and it is not interfering with his life. Potential concern to be related to alcohol use. Checking B12, TSH, RPR.

## 2020-12-08 LAB
ALBUMIN SERPL-MCNC: 4.2 G/DL (ref 3.8–4.9)
ALBUMIN/GLOB SERPL: 1.3 {RATIO} (ref 1.2–2.2)
ALP SERPL-CCNC: 69 IU/L (ref 39–117)
ALT SERPL-CCNC: 42 IU/L (ref 0–44)
AST SERPL-CCNC: 73 IU/L (ref 0–40)
BILIRUB SERPL-MCNC: 0.3 MG/DL (ref 0–1.2)
BUN SERPL-MCNC: 9 MG/DL (ref 6–24)
BUN/CREAT SERPL: 9 (ref 9–20)
CALCIUM SERPL-MCNC: 9.3 MG/DL (ref 8.7–10.2)
CHLORIDE SERPL-SCNC: 101 MMOL/L (ref 96–106)
CHOLEST SERPL-MCNC: 196 MG/DL (ref 100–199)
CO2 SERPL-SCNC: 27 MMOL/L (ref 20–29)
CREAT SERPL-MCNC: 1 MG/DL (ref 0.76–1.27)
GLOBULIN SER CALC-MCNC: 3.2 G/DL (ref 1.5–4.5)
GLUCOSE SERPL-MCNC: 112 MG/DL (ref 65–99)
HBA1C MFR BLD: 5.6 % (ref 4.8–5.6)
HDLC SERPL-MCNC: 89 MG/DL
LAB CORP EGFR IF AFRICN AM: 96 ML/MIN/1.73
LAB CORP EGFR IF NONAFRICN AM: 83 ML/MIN/1.73
LDLC SERPL CALC-MCNC: 91 MG/DL (ref 0–99)
POTASSIUM SERPL-SCNC: 4.1 MMOL/L (ref 3.5–5.2)
PROT SERPL-MCNC: 7.4 G/DL (ref 6–8.5)
RPR SER QL: NON REACTIVE
SODIUM SERPL-SCNC: 140 MMOL/L (ref 134–144)
T4 FREE SERPL-MCNC: 0.96 NG/DL (ref 0.82–1.77)
TRIGL SERPL-MCNC: 93 MG/DL (ref 0–149)
TSH SERPL DL<=0.005 MIU/L-ACNC: 2.41 UIU/ML (ref 0.45–4.5)
VIT B12 SERPL-MCNC: 229 PG/ML (ref 232–1245)
VLDLC SERPL CALC-MCNC: 16 MG/DL (ref 5–40)

## 2020-12-16 DIAGNOSIS — Z78.9 ALCOHOL USE: Primary | ICD-10-CM

## 2021-01-18 ENCOUNTER — CLINICAL SUPPORT (OUTPATIENT)
Dept: INTERNAL MEDICINE | Facility: HOSPITAL | Age: 56
End: 2021-01-18
Attending: INTERNAL MEDICINE
Payer: COMMERCIAL

## 2021-01-18 DIAGNOSIS — Z23 NEED FOR SHINGLES VACCINE: Primary | ICD-10-CM

## 2021-01-18 DIAGNOSIS — Z23 VARICELLA VACCINE: Primary | ICD-10-CM

## 2021-01-18 PROCEDURE — 90750 HZV VACC RECOMBINANT IM: CPT | Performed by: INTERNAL MEDICINE

## 2021-01-18 PROCEDURE — 3E0234Z INTRODUCTION OF SERUM, TOXOID AND VACCINE INTO MUSCLE, PERCUTANEOUS APPROACH: ICD-10-PCS | Performed by: INTERNAL MEDICINE

## 2021-03-18 ENCOUNTER — OFFICE VISIT (OUTPATIENT)
Dept: INTERNAL MEDICINE | Facility: HOSPITAL | Age: 56
End: 2021-03-18
Attending: STUDENT IN AN ORGANIZED HEALTH CARE EDUCATION/TRAINING PROGRAM
Payer: COMMERCIAL

## 2021-03-18 ENCOUNTER — PATIENT OUTREACH (OUTPATIENT)
Dept: CASE MANAGEMENT | Facility: CLINIC | Age: 56
End: 2021-03-18

## 2021-03-18 VITALS
OXYGEN SATURATION: 97 % | SYSTOLIC BLOOD PRESSURE: 139 MMHG | HEART RATE: 87 BPM | BODY MASS INDEX: 26.37 KG/M2 | HEIGHT: 67 IN | TEMPERATURE: 97.9 F | WEIGHT: 168 LBS | DIASTOLIC BLOOD PRESSURE: 79 MMHG

## 2021-03-18 DIAGNOSIS — D12.6 ADENOMATOUS POLYP OF COLON, UNSPECIFIED PART OF COLON: ICD-10-CM

## 2021-03-18 DIAGNOSIS — Z00.00 HEALTHCARE MAINTENANCE: ICD-10-CM

## 2021-03-18 DIAGNOSIS — M54.50 CHRONIC LOW BACK PAIN, UNSPECIFIED BACK PAIN LATERALITY, UNSPECIFIED WHETHER SCIATICA PRESENT: ICD-10-CM

## 2021-03-18 DIAGNOSIS — Z11.59 NEED FOR HEPATITIS C SCREENING TEST: ICD-10-CM

## 2021-03-18 DIAGNOSIS — F10.10 ALCOHOL ABUSE: ICD-10-CM

## 2021-03-18 DIAGNOSIS — Z11.4 SCREENING FOR HIV (HUMAN IMMUNODEFICIENCY VIRUS): ICD-10-CM

## 2021-03-18 DIAGNOSIS — R05.9 COUGH: ICD-10-CM

## 2021-03-18 DIAGNOSIS — H40.9 GLAUCOMA, UNSPECIFIED GLAUCOMA TYPE, UNSPECIFIED LATERALITY: Primary | ICD-10-CM

## 2021-03-18 DIAGNOSIS — Z78.9 ALCOHOL USE: ICD-10-CM

## 2021-03-18 DIAGNOSIS — G89.29 CHRONIC LOW BACK PAIN, UNSPECIFIED BACK PAIN LATERALITY, UNSPECIFIED WHETHER SCIATICA PRESENT: ICD-10-CM

## 2021-03-18 DIAGNOSIS — R41.3 MEMORY LOSS: ICD-10-CM

## 2021-03-18 DIAGNOSIS — E53.8 B12 DEFICIENCY: ICD-10-CM

## 2021-03-18 DIAGNOSIS — K76.0 FATTY LIVER: ICD-10-CM

## 2021-03-18 PROBLEM — K63.5 COLON POLYPS: Status: ACTIVE | Noted: 2021-03-18

## 2021-03-18 PROCEDURE — 99214 OFFICE O/P EST MOD 30 MIN: CPT | Performed by: FAMILY MEDICINE

## 2021-03-18 RX ORDER — GUAIFENESIN 600 MG/1
600 TABLET, EXTENDED RELEASE ORAL 2 TIMES DAILY
Qty: 20 TABLET | Refills: 0 | Status: SHIPPED | OUTPATIENT
Start: 2021-03-18 | End: 2021-03-28

## 2021-03-18 RX ORDER — FOLIC ACID 1 MG/1
1 TABLET ORAL DAILY
Qty: 90 TABLET | Refills: 3 | Status: SHIPPED | OUTPATIENT
Start: 2021-03-18 | End: 2022-01-04 | Stop reason: SDUPTHER

## 2021-03-18 RX ORDER — LANOLIN ALCOHOL/MO/W.PET/CERES
100 CREAM (GRAM) TOPICAL DAILY
Qty: 90 TABLET | Refills: 3 | Status: SHIPPED | OUTPATIENT
Start: 2021-03-18 | End: 2022-01-04 | Stop reason: SDUPTHER

## 2021-03-18 RX ORDER — LANOLIN ALCOHOL/MO/W.PET/CERES
1000 CREAM (GRAM) TOPICAL DAILY
Qty: 90 TABLET | Refills: 3 | Status: SHIPPED | OUTPATIENT
Start: 2021-03-18 | End: 2022-01-04 | Stop reason: SDUPTHER

## 2021-03-18 ASSESSMENT — ENCOUNTER SYMPTOMS
CHILLS: 0
PALPITATIONS: 0
FATIGUE: 0
FEVER: 0
ABDOMINAL DISTENTION: 0
VOMITING: 0
ABDOMINAL PAIN: 0
CONSTIPATION: 0
NAUSEA: 0
DIARRHEA: 0
CONFUSION: 0

## 2021-03-18 ASSESSMENT — PAIN SCALES - GENERAL: PAINLEVEL: 5

## 2021-03-18 ASSESSMENT — PATIENT HEALTH QUESTIONNAIRE - PHQ9: SUM OF ALL RESPONSES TO PHQ9 QUESTIONS 1 & 2: 0

## 2021-03-18 NOTE — ASSESSMENT & PLAN NOTE
Discussed  LFTs mildly elevated in 12/2020     - Recheck CMP around 6/2020  - Re-prescribing nutritional supplements

## 2021-03-18 NOTE — PROGRESS NOTES
He is having problems with short term memory and worsening neuropsychiatric issues over the last two years. He is able to complete ADLs but is significantly limited in IADLs. History of heavy alcohol use/alcohol use disorder also untreated likely b12 deficiency. H/o fatty liver and ongoing alcohol use    Get MRI brain  Referred to neurology  Check labs  Replete b12, thiamine, folate orally  US abd with elastography  Referral back to GI for f/u colonoscopy, last colonoscopy 3 yrs ago with several polyps including a sessile serrated adenoma.   Involved SW today for helping his wife with reources as she is primary care giver and has little support    Suspect he may have korsakoff's dementia     Rebecca Blakely MD

## 2021-03-18 NOTE — ASSESSMENT & PLAN NOTE
Referred to PT last visit - did not receive referral    - Will trial tylenol at home  - Will refer to PT

## 2021-03-18 NOTE — PROGRESS NOTES
"DIANA received request from Dr. Blakely to meet with Rickie and his wife, Ina, in office to discuss memory issues.     Rickie is a 54YO w/ dx that include alcohol abuse (3+ times inpatient rehab), glaucoma, B12 deficiency. He has  and [facility].    Living & Supports  Rickie lives with his wife, Ina. They don't have children. 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   Rickie was working at [employer], but left the job in 2010/2011 d/t alcohol and drug abuse. He's been inpatient and group home in [place], as well as [facility] here and another facility they can't remember in [place]. He admits that alcohol and marijuana abuse effected his ability to hold and maintain a job.    Rickie has no income. Ina attempted and he was denied SSDI in 2011. We discussed at length that he may now be eligible given his new medical issues. She is encouraged to help him apply for SSDI again after he meets w/ neuro and obtains a dx. Application info reviewed. Ina has no issues applying w/o assistance.    Current Issues  Rickie is having issues with ST memory and impaired judgement/decision making. Ina explains that she has to write a list for the grocery store. She doesn't trust he'll be able to find way home out of the city - he used to travel to [place] for [employer] shows, but she doesn't allow anymore due to an incident where he came home with clothes torn and dirty and no memory of what happened. Not sure if this is from memory issues or alcohol abuse. Rickie had difficulty remembering what he ate for breakfast. Some word finding difficulty noted, though he's a quiet man as it is.   Rickie and Ina deny any issues remembering names, faces. Deny issues w/ ADLs, incontinence. Calendars throughout the house to assist with date.  They deny any changes in behavior. Rickie notes \"the COVID " "blues,\" being isolated and quarantined.   Ina manages all finances, meals.     Recommendations  -PCP referring to [place] [facility]. We discussed the [facility] and the programs available, specifically [facility] Program. Because he's under 65, he will need referral to [facility] from [place] [facility].    -Showed Ina [email or web address].org and how to navigate site to see resources, tips for caregiver. Provided 24/7 helpline. Encouraged her to call to be connected with local alzheimer/caregiver chapter and other support systems.    -Discussed the importance of a routine, especially given current stage and Rickie's level of awareness. Discussed the benefits of his connection and ability to play music, and how this is shown to support the brain. Ina says he plays by ear, doesn't read sheet music. Encouraged her to encourage music engagement often.     -Suggested she look into [facility] Caregiver Support Program for her own assistance. We also discussed Waiver Long Term Support Services and how to apply. It will be beneficial for him to see neuro for eval and obtain dx.     We also discussed the effects of alcohol and drug use on the memory long term. Discussed the various types of neurological conditions associated with dementia, and how each look different for every person. Reiterated benefits of stopping alcohol and marijuana consumption as it will only result in further decline, as well as confusion for symptom monitoring. Ina claims there's no alcohol in the home and this behavior has stopped.    SW did not detect any relationship issues at this time and feels Rickie is well supported by Ina. Will monitor caregiver burden and stress and remain available to connect both Rickie and Ina to appropriate resources. Contact info provided.        PRIMITIVO Lopez  [phone]    "

## 2021-03-18 NOTE — PATIENT INSTRUCTIONS
Schedule MRI of the brain    Call [phone] to make an appointment with Dr. Smith at [place], a neurologist    Start taking vitamin b12, folate and thiamine    Get blood work    See gastroenterology her at [facility]    Schedule US of abdomen.

## 2021-03-18 NOTE — PROGRESS NOTES
[facility]  Internal Medicine Progress Note       SUBJECTIVE   Rickie Kaminski is a 55 y.o. year-old male with a past medical history of alcohol use c/b alcoholic fatty liver disease who presents for follow-up. He was last seen on 10/20/2020 by Dr. De La Vega.    Interval History: Patient with chronic back pain. Says he has been hunched over a lot. No warning signs. Does not take anything for pain medications.    Endorsing a cough productive of green sputum of at least one month. No chest pain or SOB. No smoking hx. Denies sick contacts, lives at home with wife.  No diarrhea, constipation. Pt had COVID shot a few days ago on 3/15.     Per wife, has been having increased issues with his memory. She states that these memory issues have been getting worse in the past year. The patient is able to perform his ADLs but he is unable to travel to doctor's appointments himself or care for himself even at the age of 55. The patient does not have a history of dementia in his family. The patient has no brain imaging on file.    Social Hx: Daily marijuana smoker, 2 joints a day for about 45 years.  No cigarette smoking, no vaping. Alcohol use is 2-3 beers a daily.      HCM: All immunizations UTD on all  Flu shot + Shingles vaccine 10/2020  PPSV 23 2016  Colonoscopy 3/5/2017     REVIEW OF SYSTEMS   Review of Systems   Constitutional: Negative for chills, fatigue and fever.   Cardiovascular: Negative for chest pain, palpitations and leg swelling.   Gastrointestinal: Negative for abdominal distention, abdominal pain, constipation, diarrhea, nausea and vomiting.   Psychiatric/Behavioral: Negative for confusion.        MEDICATIONS        Current Outpatient Medications:   •  cyanocobalamin (VITAMIN B12) 1,000 mcg tablet, Take 1 tablet (1,000 mcg total) by mouth daily., Disp: 90 tablet, Rfl: 3  •  folic acid (FOLVITE) 1 mg tablet, Take 1 tablet (1 mg total) by mouth daily., Disp: 90 tablet, Rfl: 3  •  guaiFENesin  "(MUCINEX) 600 mg 12 hr tablet, Take 1 tablet (600 mg total) by mouth 2 (two) times a day for 10 days., Disp: 20 tablet, Rfl: 0  •  therapeutic multivitamin (THERAGRAN) tablet, Take 1 tablet by mouth daily., Disp: 90 tablet, Rfl: 3  •  thiamine 100 mg tablet, Take 1 tablet (100 mg total) by mouth daily., Disp: 90 tablet, Rfl: 3    PHYSICAL EXAMINATION   Vital signs:   Visit Vitals  /79 (BP Location: Left upper arm, Patient Position: Sitting)   Pulse 87   Temp 36.6 °C (97.9 °F) (Temporal)   Ht 1.702 m (5' 7\")   Wt 76.2 kg (168 lb)   SpO2 97%   BMI 26.31 kg/m²     Physical Exam  Constitutional:       Appearance: Normal appearance.   HENT:      Mouth/Throat:      Mouth: Mucous membranes are moist.   Eyes:      Extraocular Movements: Extraocular movements intact.   Cardiovascular:      Rate and Rhythm: Normal rate and regular rhythm.   Pulmonary:      Effort: Pulmonary effort is normal.      Breath sounds: Normal breath sounds.   Abdominal:      General: There is distension.      Palpations: Abdomen is soft.   Musculoskeletal:      Right lower leg: No edema.      Left lower leg: No edema.   Skin:     General: Skin is warm and dry.   Neurological:      Mental Status: He is alert.      Cranial Nerves: No cranial nerve deficit.      Motor: No weakness.      Coordination: Coordination normal.      Deep Tendon Reflexes: Reflexes normal.      Comments: Moving all extremities  Slow to respond  AAOx2-3 Knew person, place, thought it was 2020, did not know month          LABS / IMAGING/STUDIES      Labs Reviewed:   Last BMP:  Lab Results   Component Value Date     12/07/2020    K 4.1 12/07/2020     12/07/2020    CO2 27 12/07/2020    BUN 9 12/07/2020    CREATININE 1.00 12/07/2020       Last CBC:  Lab Results   Component Value Date    WBC 4.1 08/15/2018    HGB 14.7 08/15/2018    HCT 44.6 08/15/2018    MCV 72 (L) 08/15/2018     08/15/2018       Last Lipids:  Lab Results   Component Value Date    CHOL 196 " 12/07/2020    HDL 89 12/07/2020    LDLCALC 91 12/07/2020    TRIG 93 12/07/2020       Last three HgbA1c:  Lab Results   Component Value Date    HGBA1C 5.6 12/07/2020    HGBA1C 5.4 08/15/2018    HGBA1C 5.7 (H) 08/12/2017       Last Microalbumin:  No results found for: MICROALBUR    Last TSH:  Lab Results   Component Value Date    TSH 2.410 12/07/2020       Last Vitamin D:  No results found for: [id number]    Studies/Imaging Reviewed: Reviewed        ASSESSMENT AND PLAN      Problem List Items Addressed This Visit        Nervous    Glaucoma - Primary     Referred to ophtho last visit         Chronic low back pain     Referred to PT last visit - did not receive referral    - Will trial tylenol at home  - Will refer to PT         Relevant Orders    Ambulatory referral to Physical Therapy       Respiratory    Cough     Cough productive of green sputum, no other sxs    - Will trial mucinex         Relevant Medications    guaiFENesin (MUCINEX) 600 mg 12 hr tablet       Digestive    B12 deficiency     B12 229 in 12/2020    - Prescribe B12 supplementation         Relevant Medications    cyanocobalamin (VITAMIN B12) 1,000 mcg tablet    Other Relevant Orders    CBC and differential    Methylmalonic Acid, Serum    Colon polyps    Relevant Orders    Ambulatory referral to [facility]       Other    Alcohol use     Discussed  LFTs mildly elevated in 12/2020     - Recheck CMP around 6/2020  - Re-prescribing nutritional supplements         Relevant Medications    folic acid (FOLVITE) 1 mg tablet    therapeutic multivitamin (THERAGRAN) tablet    thiamine 100 mg tablet    Other Relevant Orders    Comprehensive metabolic panel    Protime-INR    Healthcare maintenance     UTD         Memory loss     Obtain MRI  Refer to neurology         Relevant Orders    MRI BRAIN WITH AND WITHOUT CONTRAST    MRI BRAIN WITHOUT CONTRAST    Ambulatory referral to Neurology      Other Visit Diagnoses     Alcohol abuse        Relevant  Medications    folic acid (FOLVITE) 1 mg tablet    therapeutic multivitamin (THERAGRAN) tablet    thiamine 100 mg tablet    Other Relevant Orders    ULTRASOUND ABDOMEN COMPLETE W ELASTOGRAPHY    Fatty liver        Relevant Orders    ULTRASOUND ABDOMEN COMPLETE W ELASTOGRAPHY    Need for hepatitis C screening test        Relevant Orders    Hepatitis C antibody    Screening for HIV (human immunodeficiency virus)        Relevant Orders    HIV 1,2 AB P24 AG           HEALTHCARE MAINTENANCE   Health Maintenance Due   Topic Date Due   • HIV Screening  Never done   • Colonoscopy  03/15/2020          Pura Tovar MD  03/18/21  3:56 PM

## 2021-03-22 ENCOUNTER — TELEPHONE (OUTPATIENT)
Dept: INTERNAL MEDICINE | Facility: HOSPITAL | Age: 56
End: 2021-03-22

## 2021-03-24 LAB
ALBUMIN SERPL-MCNC: 4.2 G/DL (ref 3.8–4.9)
ALBUMIN/GLOB SERPL: 1.1 {RATIO} (ref 1.2–2.2)
ALP SERPL-CCNC: 82 IU/L (ref 39–117)
ALT SERPL-CCNC: 21 IU/L (ref 0–44)
AST SERPL-CCNC: 34 IU/L (ref 0–40)
BASOPHILS # BLD AUTO: 0 X10E3/UL (ref 0–0.2)
BASOPHILS NFR BLD AUTO: 1 %
BILIRUB SERPL-MCNC: 0.4 MG/DL (ref 0–1.2)
BUN SERPL-MCNC: 8 MG/DL (ref 6–24)
BUN/CREAT SERPL: 8 (ref 9–20)
CALCIUM SERPL-MCNC: 9.3 MG/DL (ref 8.7–10.2)
CHLORIDE SERPL-SCNC: 100 MMOL/L (ref 96–106)
CO2 SERPL-SCNC: 26 MMOL/L (ref 20–29)
CREAT SERPL-MCNC: 0.97 MG/DL (ref 0.76–1.27)
EOSINOPHIL # BLD AUTO: 0.1 X10E3/UL (ref 0–0.4)
EOSINOPHIL NFR BLD AUTO: 2 %
ERYTHROCYTE [DISTWIDTH] IN BLOOD BY AUTOMATED COUNT: 16.6 % (ref 11.6–15.4)
GLOBULIN SER CALC-MCNC: 3.8 G/DL (ref 1.5–4.5)
GLUCOSE SERPL-MCNC: 107 MG/DL (ref 65–99)
HCT VFR BLD AUTO: 48.4 % (ref 37.5–51)
HCV AB S/CO SERPL IA: <0.1 S/CO RATIO (ref 0–0.9)
HGB BLD-MCNC: 15.7 G/DL (ref 13–17.7)
HIV 1+2 AB+HIV1 P24 AG SERPL QL IA: NON REACTIVE
IMM GRANULOCYTES # BLD AUTO: 0 X10E3/UL (ref 0–0.1)
IMM GRANULOCYTES NFR BLD AUTO: 0 %
INR PPP: 1 (ref 0.9–1.2)
LAB CORP EGFR IF AFRICN AM: 100 ML/MIN/1.73
LAB CORP EGFR IF NONAFRICN AM: 86 ML/MIN/1.73
LYMPHOCYTES # BLD AUTO: 1.8 X10E3/UL (ref 0.7–3.1)
LYMPHOCYTES NFR BLD AUTO: 53 %
MCH RBC QN AUTO: 24.3 PG (ref 26.6–33)
MCHC RBC AUTO-ENTMCNC: 32.4 G/DL (ref 31.5–35.7)
MCV RBC AUTO: 75 FL (ref 79–97)
MONOCYTES # BLD AUTO: 0.3 X10E3/UL (ref 0.1–0.9)
MONOCYTES NFR BLD AUTO: 9 %
MORPHOLOGY BLD-IMP: ABNORMAL
NEUTROPHILS # BLD AUTO: 1.2 X10E3/UL (ref 1.4–7)
NEUTROPHILS NFR BLD AUTO: 35 %
PLATELET # BLD AUTO: 261 X10E3/UL (ref 150–450)
POTASSIUM SERPL-SCNC: 4.1 MMOL/L (ref 3.5–5.2)
PROT SERPL-MCNC: 8 G/DL (ref 6–8.5)
PROTHROMBIN TIME: 10.9 SEC (ref 9.1–12)
RBC # BLD AUTO: 6.45 X10E6/UL (ref 4.14–5.8)
SODIUM SERPL-SCNC: 139 MMOL/L (ref 134–144)
WBC # BLD AUTO: 3.4 X10E3/UL (ref 3.4–10.8)

## 2021-03-24 NOTE — PROGRESS NOTES
3/24  Attempt to reach Rickie and Ina to f/u on psychosocial. Per [facility], they will need to see a [facility] first since he is under 65, then can receive services through the Center. SW to monitor that they were able to make appt w/ [facility].

## 2021-03-25 NOTE — TELEPHONE ENCOUNTER
MRI Brain:  APPROVED    AUTH#[id number]    EFF:  03/22/2021 - 05/21/2021    Spoke to pt's wife (Ina) provided auth number. Pt has an appt 4/3/2021.

## 2021-03-29 LAB
LAB CORP DISCLAIMER:: NORMAL
METHYLMALONATE SERPL-SCNC: 138 NMOL/L (ref 0–378)

## 2021-04-03 ENCOUNTER — HOSPITAL ENCOUNTER (OUTPATIENT)
Dept: RADIOLOGY | Facility: HOSPITAL | Age: 56
Discharge: HOME | End: 2021-04-03
Attending: FAMILY MEDICINE
Payer: COMMERCIAL

## 2021-04-03 DIAGNOSIS — R41.3 MEMORY LOSS: ICD-10-CM

## 2021-04-03 PROCEDURE — G1004 CDSM NDSC: HCPCS

## 2021-04-05 NOTE — PROGRESS NOTES
4/5  Rickie has [facility] appt scheduled 4/13 to review memory loss and referral to [facility].

## 2021-04-06 ENCOUNTER — HOSPITAL ENCOUNTER (OUTPATIENT)
Dept: RADIOLOGY | Facility: HOSPITAL | Age: 56
Discharge: HOME | End: 2021-04-06
Attending: FAMILY MEDICINE
Payer: COMMERCIAL

## 2021-04-06 DIAGNOSIS — K76.0 FATTY LIVER: ICD-10-CM

## 2021-04-06 DIAGNOSIS — F10.10 ALCOHOL ABUSE: ICD-10-CM

## 2021-04-06 PROCEDURE — 76981 USE PARENCHYMA: CPT

## 2021-04-14 ENCOUNTER — TELEPHONE (OUTPATIENT)
Dept: INTERNAL MEDICINE | Facility: HOSPITAL | Age: 56
End: 2021-04-14

## 2021-04-14 DIAGNOSIS — R06.83 SNORING: Primary | ICD-10-CM

## 2021-04-14 NOTE — TELEPHONE ENCOUNTER
Pt saw Dr. Stroud and he wants pt to get sleep apnea, EEG, and a brainwave test. Pt wife states they need referrals placed for order and pt has to return to see Dr. Stroud back after the sleep apnea study.

## 2021-04-14 NOTE — TELEPHONE ENCOUNTER
I called Ina and we discussed that I ordered the sleep study and our sleep center will reach out to her to get that scheduled for Rickie. We also discussed the MRI brain and US liver with elastography results.     She will have the EEG completed at [facility].     I will see Rickie in the office next month as scheduled and Ina knows to reach out to me before then if there are any issues.

## 2021-05-20 ENCOUNTER — OFFICE VISIT (OUTPATIENT)
Dept: GASTROENTEROLOGY | Facility: HOSPITAL | Age: 56
End: 2021-05-20
Attending: STUDENT IN AN ORGANIZED HEALTH CARE EDUCATION/TRAINING PROGRAM
Payer: COMMERCIAL

## 2021-05-20 VITALS
HEART RATE: 93 BPM | WEIGHT: 158 LBS | SYSTOLIC BLOOD PRESSURE: 132 MMHG | DIASTOLIC BLOOD PRESSURE: 86 MMHG | TEMPERATURE: 98 F | HEIGHT: 67 IN | BODY MASS INDEX: 24.8 KG/M2 | OXYGEN SATURATION: 98 % | RESPIRATION RATE: 18 BRPM

## 2021-05-20 DIAGNOSIS — Z12.11 COLON CANCER SCREENING: Primary | ICD-10-CM

## 2021-05-20 PROBLEM — K70.0 FATTY LIVER DUE TO ALCOHOLISM: Status: ACTIVE | Noted: 2021-05-20

## 2021-05-20 RX ORDER — POLYETHYLENE GLYCOL 3350 17 G/17G
POWDER, FOR SOLUTION ORAL
Qty: 238 G | Refills: 0 | Status: SHIPPED | OUTPATIENT
Start: 2021-05-20 | End: 2022-05-04

## 2021-05-20 RX ORDER — BISACODYL 5 MG
10 TABLET, DELAYED RELEASE (ENTERIC COATED) ORAL 2 TIMES DAILY
Qty: 4 TABLET | Refills: 0 | Status: SHIPPED | OUTPATIENT
Start: 2021-05-20 | End: 2022-05-04

## 2021-05-20 ASSESSMENT — ENCOUNTER SYMPTOMS
ABDOMINAL PAIN: 0
VOMITING: 0
BLOOD IN STOOL: 0
CONSTIPATION: 0
DIARRHEA: 0
NAUSEA: 0

## 2021-05-20 NOTE — ASSESSMENT & PLAN NOTE
-Chronic EtOH abuse, now just drinking 2 24 oz beer per day  -Abd US with increased echogenicity of the liver, with liver stiffness of 6.92 kPa.  -Normal LFTs  -without signs/symptoms of chronic liver disease    Plan:  -discussed importance of continued weaning of EtOH and cessation  -folate, thiamine

## 2021-05-20 NOTE — PROGRESS NOTES
Gastroenterology Clinic Note       SUBJECTIVE   Rickie Kaminski is a 55 y.o. year-old male with a past medical history of Fatty liver disease 2/2 EtOH abuse who presents for CRC screening.    Patients last colonoscopy was 3/15/2017 for CRC screening with excellent prep to the cecum, that revealed 2 sessile polyps in the rectosigmoid colon, 5 to 8 mm in size, removed by hot snare (Bx SSA, HP), multiple semisessile polyps in the rectum that were biopsied(HP).  Was recommended for repeat colonoscopy in 3 years for surveillance.    He denies abdominal pain, nausea, vomiting, constipation, diarrhea, melena, hematochezia.  He takes no AC/AP and denies NSAID use.  He denies family history of CRC, IBD or genetic disorders.    He also admits to chronic EtOH abuse,  currently drinking approximately 224 ounce cans of beer per day, no liquor or wine.  This is significant improved from the past, where he reportedly drank significant amounts of hard liquor.  Previously had mild elevations LFTs, however most recent CMP revealed normal LFTs.  Abdominal ultrasound showed increased echogenicity of the liver, with liver stiffness of 6.92 kPa.    Colonoscopy was 3/15/2017:  -Two semi-sessile polyps were found in the recto-sigmoid colon. The polyps were 5 to 8 mm in size. These polyps were removed with a hot snare. Resection and retrieval were complete. Estimated blood loss: none. An area around then 8mm polyp was also biopsied for abnormal appearing mucosa.  -Multiple semi-sessile polyps were found in the rectum. The polyps were 2 to 4 mm in size. These were biopsied with a cold forceps for histology. Estimated blood loss was minimal.     REVIEW OF SYSTEMS   Review of Systems   Gastrointestinal: Negative for abdominal pain, blood in stool, constipation, diarrhea, nausea and vomiting.        MEDICATIONS        Current Outpatient Medications:   •  bisacodyL (DULCOLAX) 5 mg EC tablet, Take 2 tablets (10 mg total) by mouth 2 (two)  "times a day for 2 doses. Take day prior to procedure. 2 tabs at 3pm and 2 tabs at 6pm., Disp: 4 tablet, Rfl: 0  •  cyanocobalamin (VITAMIN B12) 1,000 mcg tablet, Take 1 tablet (1,000 mcg total) by mouth daily., Disp: 90 tablet, Rfl: 3  •  folic acid (FOLVITE) 1 mg tablet, Take 1 tablet (1 mg total) by mouth daily., Disp: 90 tablet, Rfl: 3  •  polyethylene glycol (MIRALAX) 17 gram/dose powder, Mix 238G with 64oz non-red gatorade, 3pm drink 8oz every 10min until 1/2 gone, 6pm drink 8oz every 10min until complete, Disp: 238 g, Rfl: 0  •  therapeutic multivitamin (THERAGRAN) tablet, Take 1 tablet by mouth daily., Disp: 90 tablet, Rfl: 3  •  thiamine 100 mg tablet, Take 1 tablet (100 mg total) by mouth daily., Disp: 90 tablet, Rfl: 3    PHYSICAL EXAMINATION   Vital signs:   Visit Vitals  /86   Pulse 93   Temp 36.7 °C (98 °F) (Oral)   Resp 18   Ht 1.702 m (5' 7\")   Wt 71.7 kg (158 lb)   SpO2 98%   BMI 24.75 kg/m²     Physical Exam  Constitutional:       General: He is not in acute distress.  Cardiovascular:      Rate and Rhythm: Normal rate.      Heart sounds: No murmur heard.     Pulmonary:      Effort: Pulmonary effort is normal. No respiratory distress.   Abdominal:      General: Bowel sounds are normal. There is no distension.      Palpations: Abdomen is soft.      Tenderness: There is no abdominal tenderness.   Neurological:      Mental Status: He is alert and oriented to person, place, and time.          LABS / IMAGING/STUDIES      Labs Reviewed:   Lab Results   Component Value Date    GLUCOSE 107 (H) 03/23/2021     03/23/2021    K 4.1 03/23/2021    CO2 26 03/23/2021     03/23/2021    BUN 8 03/23/2021    CREATININE 0.97 03/23/2021     Lab Results   Component Value Date    WBC 3.4 03/23/2021    HGB 15.7 03/23/2021    HCT 48.4 03/23/2021    MCV 75 (L) 03/23/2021     03/23/2021     Lab Results   Component Value Date    HEPCAB <0.1 03/23/2021       Studies/Imaging Reviewed:       GI Testing    "   Colonoscopy:  EGD:  Hepatitis C antibody:   ASSESSMENT AND PLAN   Problem List Items Addressed This Visit        Other    Colon cancer screening - Primary    Current Assessment & Plan     -Colonoscopy 3/2017: 1 SSA, multiple HP polyps  -due repeat in 3 years    Plan:  -Colonoscopy 7/19 at 10AM   -Bowel prep reviewed with patient   -COVID test ordered, patient to schedule           Relevant Orders    Case Request GI: FLEXIBLE COLONOSCOPY PROXIMAL TO SPLENIC FLEXURE WITH BIOPSY (Completed)    COVID-19 PAT/Pre-Procedural           Darius Kathleen DO  05/20/21  4:23 PM

## 2021-05-20 NOTE — ASSESSMENT & PLAN NOTE
-Colonoscopy 3/2017: 1 SSA, multiple HP polyps  -due repeat in 3 years    Plan:  -Colonoscopy 7/19 at 10AM   -Bowel prep reviewed with patient   -COVID test ordered, patient to schedule

## 2021-05-20 NOTE — PATIENT INSTRUCTIONS
Please call [phone]    You are having a Colonoscopy on 7/19/21 at 10AM. Please arrive 1 hour early.    The directions are as follows:  Arrange for a ride to and from the procedure.  You need someone to be with you either driving you or with you in a cab, Uber, or public transportation.    3 days before your procedure, stop eating any nuts, seeds, or popcorn.  Purchase some clear liquid that is not red.  2 days prior to your procedure stay hydrated.      The day prior to your procedure    Stop eating all solid foods.  You will start a clear liquid diet.  Examples of clear liquids or Gatorade, Pedialyte, or Powerade clear broths or bouillon.  Black coffee and tea are fine but do not use any milk or creamers.  Water ice is okay.  You can have carbonated and noncarbonated soft drinks or other fruit flavored drinks.     MiraLAX prep  Mix 238 g of polyethylene glycol 3350 AKA MiraLAX or generic brand with a 64 ounces of Gatorade, Crystal Lite, or another electrolyte drink.  This is your bowel solution.    At 3:00 PM the day prior to the procedure, take 2 dulcolax tablets and you will drink 8 ounces of the solution every 10-15 minutes until one half of the solution is gone.  If you become nauseous, slow down or take a break.  Once half of the drink is gone, you can take a break.  At 6 PM, take 2 dulcolax tablets and you will start drinking 8 ounces every 10-15 minutes again until the solution is gone.    The goal is that your bowel movements should be light yellow to clear liquid only without any solid stool.  At midnight, do not eat or drink anything.  You will not eat or drink anything the morning of the procedure.    Stay near a toilet that day as the goal of this is to produce diarrhea and to clean out your colon so that we can get a good look with the colonoscopy.  If you have vomiting, stop drinking the preparation for 30 minutes and attempt again at a slower pace.    The day of the procedure you will arrive at  the [facility] at [facility] on the first floor by the Main Entrance at least 1 hour before your procedure. (Parking Garage A)    All medications can be taken regularly.  If you need to take any of your medications the morning of the procedure, then take them with small sips of water first thing in the morning.

## 2021-06-30 ENCOUNTER — OFFICE VISIT (OUTPATIENT)
Dept: INTERNAL MEDICINE | Facility: HOSPITAL | Age: 56
End: 2021-06-30
Attending: FAMILY MEDICINE
Payer: COMMERCIAL

## 2021-06-30 VITALS
SYSTOLIC BLOOD PRESSURE: 129 MMHG | TEMPERATURE: 98.3 F | DIASTOLIC BLOOD PRESSURE: 82 MMHG | OXYGEN SATURATION: 98 % | WEIGHT: 162 LBS | RESPIRATION RATE: 18 BRPM | HEART RATE: 94 BPM | BODY MASS INDEX: 25.43 KG/M2 | HEIGHT: 67 IN

## 2021-06-30 DIAGNOSIS — F10.29 ALCOHOL DEPENDENCE WITH UNSPECIFIED ALCOHOL-INDUCED DISORDER: ICD-10-CM

## 2021-06-30 DIAGNOSIS — H40.9 GLAUCOMA, UNSPECIFIED GLAUCOMA TYPE, UNSPECIFIED LATERALITY: ICD-10-CM

## 2021-06-30 DIAGNOSIS — R06.83 SNORING: Primary | ICD-10-CM

## 2021-06-30 DIAGNOSIS — R41.89 COGNITIVE DECLINE: ICD-10-CM

## 2021-06-30 PROBLEM — R73.03 PREDIABETES: Status: RESOLVED | Noted: 2018-08-15 | Resolved: 2021-06-30

## 2021-06-30 PROBLEM — R05.9 COUGH: Status: RESOLVED | Noted: 2021-03-18 | Resolved: 2021-06-30

## 2021-06-30 PROCEDURE — 3008F BODY MASS INDEX DOCD: CPT | Performed by: FAMILY MEDICINE

## 2021-06-30 PROCEDURE — 99214 OFFICE O/P EST MOD 30 MIN: CPT | Performed by: FAMILY MEDICINE

## 2021-06-30 ASSESSMENT — PAIN SCALES - GENERAL: PAINLEVEL: 0-NO PAIN

## 2021-06-30 NOTE — PROGRESS NOTES
SUBJECTIVE    Rickie Kaminski is a 55 y.o. male who  has a past medical history of Alcoholism (CMS/HCC), Epilepsy (CMS/HCC), and Memory loss. who presents for routine follow up.     He comes in today with his wife, Ina.    Since they were last seen in our office Rickie saw the neurologist at [place], Dr. Stroud for evaluation of cognitive decline. He was recommended to undergo an EEG which he did have. He also had an MRI of the brain that I ordered. He was also recommended to taper his alcohol use.     Rickie and his wife report his symptoms are largely stable.     He is drinking two 16 ounce malt liquor 11% partly because the cost is lower than other beers. He previously drank a 5th of vodka daily.     He can tell me the year but not the day or month. He is oriented to self. He is oriented to place.     He hasn't been able to work for a number of years due to his alcoholism and cognitive decline. His memory is severely limited.     His wife tells me they have also established care with ophthalmology and he has some pretty serious glaucoma which he is using drops for.     Past Medical History:   Diagnosis Date   • Alcoholism (CMS/HCC)    • Epilepsy (CMS/HCC)    • Memory loss      Social History     Tobacco Use   • Smoking status: Never Smoker   • Smokeless tobacco: Never Used   Substance Use Topics   • Alcohol use: No   • Drug use: No     Current Medications:   Current Outpatient Medications:   •  cyanocobalamin (VITAMIN B12) 1,000 mcg tablet, Take 1 tablet (1,000 mcg total) by mouth daily., Disp: 90 tablet, Rfl: 3  •  folic acid (FOLVITE) 1 mg tablet, Take 1 tablet (1 mg total) by mouth daily., Disp: 90 tablet, Rfl: 3  •  therapeutic multivitamin (THERAGRAN) tablet, Take 1 tablet by mouth daily., Disp: 90 tablet, Rfl: 3  •  thiamine 100 mg tablet, Take 1 tablet (100 mg total) by mouth daily., Disp: 90 tablet, Rfl: 3  •  bisacodyL (DULCOLAX) 5 mg EC tablet, Take 2 tablets (10 mg total) by mouth 2 (two) times a  "day for 2 doses. Take day prior to procedure. 2 tabs at 3pm and 2 tabs at 6pm., Disp: 4 tablet, Rfl: 0  •  polyethylene glycol (MIRALAX) 17 gram/dose powder, Mix 238G with 64oz non-red gatorade, 3pm drink 8oz every 10min until 1/2 gone, 6pm drink 8oz every 10min until complete, Disp: 238 g, Rfl: 0    Allergies: Crab, Shellfish containing products, and Shellfish derived    Objective   Physical Exam:  Visit Vitals  /82   Pulse 94   Temp 36.8 °C (98.3 °F) (Oral)   Resp 18   Ht 1.702 m (5' 7\")   Wt 73.5 kg (162 lb)   SpO2 98%   BMI 25.37 kg/m²      Physical Exam  Vitals reviewed.   Constitutional:       General: He is not in acute distress.     Appearance: He is not ill-appearing.   HENT:      Head: Normocephalic and atraumatic.   Eyes:      General: No scleral icterus.  Cardiovascular:      Rate and Rhythm: Normal rate.   Pulmonary:      Effort: Pulmonary effort is normal.      Breath sounds: No wheezing.   Abdominal:      General: There is no distension.      Palpations: Abdomen is soft.      Tenderness: There is no abdominal tenderness.   Skin:     General: Skin is warm and dry.   Neurological:      General: No focal deficit present.      Mental Status: He is alert.      Motor: No weakness.      Gait: Gait normal.      Deep Tendon Reflexes: Reflexes normal.      Comments: He answers questions appropriately. His eye contact is limited.    Psychiatric:         Mood and Affect: Mood normal.       MRI 4/3/21  No acute intracranial abnormality.  White matter microangiopathic changes.Cerebral volume loss.    Lab Results   Component Value Date    CHOL 196 12/07/2020    CHOL 196 08/12/2017     Lab Results   Component Value Date    HDL 89 12/07/2020    HDL 87 08/12/2017     Lab Results   Component Value Date    LDLCALC 91 12/07/2020    LDLCALC 85 08/12/2017     Lab Results   Component Value Date    TRIG 93 12/07/2020    TRIG 122 08/12/2017     Lab Results   Component Value Date    HGBA1C 5.6 12/07/2020     Lab Results "   Component Value Date    GLUCOSE 107 (H) 03/23/2021     03/23/2021    K 4.1 03/23/2021    CO2 26 03/23/2021     03/23/2021    BUN 8 03/23/2021    CREATININE 0.97 03/23/2021     Lab Results   Component Value Date    TSH 2.410 12/07/2020     Lab Results   Component Value Date    RPR Non Reactive 12/07/2020     Assessment & Plan:  Problem List Items Addressed This Visit        Unprioritized    Alcohol dependence (CMS/HCC)     See plan for cognitive decline         Glaucoma     Following with ophthalmology.          Cognitive decline     He is now following with neurology. His MRI showed moderate white matter microangiopathic changes. EEG showed slowing in the left side of the brain. He is not a smoker, not diabetic and his cholesterol levels are fine with LDL <100 and HDL >80. Should he take a statin?? I deferred him starting one now and asked pt and his wife to discuss with his neurologist. Certainly his heavy alcohol use for many years may be the cause of his cognitive decline. I asked the pt if he could switch to a beer with a less high alcohol content and he declines due to cost. I asked him if he felt he could reduce his alcohol to one rather than two malt liquor bottles nightly but I'm not sure he will be able to make that switch. He will continue his vitamin supplementation including thiamine, b12 and folic acid.          Snoring - Primary     He does have quite significant snoring. Other symptoms of sleep apnea are a little harder to glean. I did order him a polysomnogram. This was also a recommendation made by Dr. Stroud.          Relevant Orders    Polysomnogram (Sleep Study)          Return in about 3 months (around 9/30/2021).  Rebecca Blakely MD

## 2021-07-01 PROBLEM — Z12.11 COLON CANCER SCREENING: Status: RESOLVED | Noted: 2018-07-10 | Resolved: 2021-07-01

## 2021-07-01 PROBLEM — F10.20 ALCOHOL DEPENDENCE (CMS/HCC): Status: ACTIVE | Noted: 2018-07-10

## 2021-07-01 PROBLEM — R06.83 SNORING: Status: ACTIVE | Noted: 2021-07-01

## 2021-07-01 PROBLEM — R41.89 COGNITIVE DECLINE: Status: ACTIVE | Noted: 2020-10-20

## 2021-07-01 PROBLEM — R03.0 ELEVATED BLOOD PRESSURE READING: Status: RESOLVED | Noted: 2020-10-20 | Resolved: 2021-07-01

## 2021-07-01 NOTE — ASSESSMENT & PLAN NOTE
He does have quite significant snoring. Other symptoms of sleep apnea are a little harder to glean. I did order him a polysomnogram. This was also a recommendation made by Dr. Stroud.

## 2021-07-01 NOTE — ASSESSMENT & PLAN NOTE
He is now following with neurology. His MRI showed moderate white matter microangiopathic changes. EEG showed slowing in the left side of the brain. He is not a smoker, not diabetic and his cholesterol levels are fine with LDL <100 and HDL >80. Should he take a statin?? I deferred him starting one now and asked pt and his wife to discuss with his neurologist. Certainly his heavy alcohol use for many years may be the cause of his cognitive decline. I asked the pt if he could switch to a beer with a less high alcohol content and he declines due to cost. I asked him if he felt he could reduce his alcohol to one rather than two malt liquor bottles nightly but I'm not sure he will be able to make that switch. He will continue his vitamin supplementation including thiamine, b12 and folic acid.

## 2021-07-14 ENCOUNTER — APPOINTMENT (OUTPATIENT)
Dept: LAB | Facility: HOSPITAL | Age: 56
End: 2021-07-14
Attending: STUDENT IN AN ORGANIZED HEALTH CARE EDUCATION/TRAINING PROGRAM
Payer: COMMERCIAL

## 2021-07-14 DIAGNOSIS — Z12.11 COLON CANCER SCREENING: ICD-10-CM

## 2021-07-14 LAB — SARS-COV-2 RNA RESP QL NAA+PROBE: NEGATIVE

## 2021-07-14 PROCEDURE — U0003 INFECTIOUS AGENT DETECTION BY NUCLEIC ACID (DNA OR RNA); SEVERE ACUTE RESPIRATORY SYNDROME CORONAVIRUS 2 (SARS-COV-2) (CORONAVIRUS DISEASE [COVID-19]), AMPLIFIED PROBE TECHNIQUE, MAKING USE OF HIGH THROUGHPUT TECHNOLOGIES AS DESCRIBED BY CMS-2020-01-R: HCPCS

## 2021-07-19 ENCOUNTER — ANESTHESIA EVENT (OUTPATIENT)
Dept: ENDOSCOPY | Facility: HOSPITAL | Age: 56
End: 2021-07-19
Payer: COMMERCIAL

## 2021-07-19 ENCOUNTER — ANESTHESIA (OUTPATIENT)
Dept: ENDOSCOPY | Facility: HOSPITAL | Age: 56
End: 2021-07-19
Payer: COMMERCIAL

## 2021-07-19 ENCOUNTER — HOSPITAL ENCOUNTER (OUTPATIENT)
Facility: HOSPITAL | Age: 56
Discharge: HOME | End: 2021-07-19
Attending: INTERNAL MEDICINE | Admitting: INTERNAL MEDICINE
Payer: COMMERCIAL

## 2021-07-19 VITALS
OXYGEN SATURATION: 100 % | HEIGHT: 67 IN | RESPIRATION RATE: 18 BRPM | WEIGHT: 175 LBS | DIASTOLIC BLOOD PRESSURE: 87 MMHG | BODY MASS INDEX: 27.47 KG/M2 | SYSTOLIC BLOOD PRESSURE: 139 MMHG | TEMPERATURE: 97.1 F | HEART RATE: 60 BPM

## 2021-07-19 PROCEDURE — 37000001 HC ANESTHESIA GENERAL: Performed by: INTERNAL MEDICINE

## 2021-07-19 PROCEDURE — 0DJD8ZZ INSPECTION OF LOWER INTESTINAL TRACT, VIA NATURAL OR ARTIFICIAL OPENING ENDOSCOPIC: ICD-10-PCS | Performed by: INTERNAL MEDICINE

## 2021-07-19 PROCEDURE — 37000006 HC ANESTHESIA LOCAL: Performed by: INTERNAL MEDICINE

## 2021-07-19 PROCEDURE — 25800000 HC PHARMACY IV SOLUTIONS: Performed by: NURSE ANESTHETIST, CERTIFIED REGISTERED

## 2021-07-19 PROCEDURE — 71000011 HC PACU PHASE 1 EA ADDL MIN: Performed by: INTERNAL MEDICINE

## 2021-07-19 PROCEDURE — 25000000 HC PHARMACY GENERAL: Performed by: NURSE ANESTHETIST, CERTIFIED REGISTERED

## 2021-07-19 PROCEDURE — 71000001 HC PACU PHASE 1 INITIAL 30MIN: Performed by: INTERNAL MEDICINE

## 2021-07-19 PROCEDURE — 75000014 HC COLONSCOPY DIAGNOSTIC: Performed by: INTERNAL MEDICINE

## 2021-07-19 PROCEDURE — 63600000 HC DRUGS/DETAIL CODE: Mod: JW | Performed by: NURSE ANESTHETIST, CERTIFIED REGISTERED

## 2021-07-19 RX ORDER — PHENYLEPHRINE HYDROCHLORIDE 10 MG/ML
INJECTION INTRAVENOUS AS NEEDED
Status: DISCONTINUED | OUTPATIENT
Start: 2021-07-19 | End: 2021-07-19 | Stop reason: SURG

## 2021-07-19 RX ORDER — PROPOFOL 10 MG/ML
INJECTION, EMULSION INTRAVENOUS CONTINUOUS PRN
Status: DISCONTINUED | OUTPATIENT
Start: 2021-07-19 | End: 2021-07-19 | Stop reason: SURG

## 2021-07-19 RX ORDER — LIDOCAINE HYDROCHLORIDE 10 MG/ML
INJECTION, SOLUTION INFILTRATION; PERINEURAL AS NEEDED
Status: DISCONTINUED | OUTPATIENT
Start: 2021-07-19 | End: 2021-07-19 | Stop reason: SURG

## 2021-07-19 RX ORDER — IBUPROFEN 200 MG
16-32 TABLET ORAL AS NEEDED
Status: DISCONTINUED | OUTPATIENT
Start: 2021-07-19 | End: 2021-07-19 | Stop reason: HOSPADM

## 2021-07-19 RX ORDER — SODIUM CHLORIDE 9 MG/ML
INJECTION, SOLUTION INTRAVENOUS CONTINUOUS PRN
Status: DISCONTINUED | OUTPATIENT
Start: 2021-07-19 | End: 2021-07-19 | Stop reason: SURG

## 2021-07-19 RX ORDER — DEXTROSE 50 % IN WATER (D50W) INTRAVENOUS SYRINGE
25 AS NEEDED
Status: DISCONTINUED | OUTPATIENT
Start: 2021-07-19 | End: 2021-07-19 | Stop reason: HOSPADM

## 2021-07-19 RX ORDER — DEXTROSE 40 %
15-30 GEL (GRAM) ORAL AS NEEDED
Status: DISCONTINUED | OUTPATIENT
Start: 2021-07-19 | End: 2021-07-19 | Stop reason: HOSPADM

## 2021-07-19 RX ORDER — PROPOFOL 200MG/20ML
SYRINGE (ML) INTRAVENOUS AS NEEDED
Status: DISCONTINUED | OUTPATIENT
Start: 2021-07-19 | End: 2021-07-19 | Stop reason: SURG

## 2021-07-19 RX ORDER — GLYCOPYRROLATE 0.6MG/3ML
SYRINGE (ML) INTRAVENOUS AS NEEDED
Status: DISCONTINUED | OUTPATIENT
Start: 2021-07-19 | End: 2021-07-19 | Stop reason: SURG

## 2021-07-19 RX ADMIN — PHENYLEPHRINE HYDROCHLORIDE 200 MCG: 10 INJECTION INTRAVENOUS at 10:49

## 2021-07-19 RX ADMIN — LIDOCAINE HYDROCHLORIDE 10 ML: 10 INJECTION, SOLUTION INFILTRATION; PERINEURAL at 10:46

## 2021-07-19 RX ADMIN — PROPOFOL INJECTABLE EMULSION 200 MCG/KG/MIN: 10 INJECTION, EMULSION INTRAVENOUS at 10:46

## 2021-07-19 RX ADMIN — SODIUM CHLORIDE: 9 INJECTION, SOLUTION INTRAVENOUS at 10:41

## 2021-07-19 RX ADMIN — GLYCOPYRROLATE 0.1 MG: 0.2 INJECTION, SOLUTION INTRAMUSCULAR; INTRAVITREAL at 10:44

## 2021-07-19 RX ADMIN — PROPOFOL 30 MG: 10 INJECTION, EMULSION INTRAVENOUS at 11:01

## 2021-07-19 ASSESSMENT — LIFESTYLE VARIABLES: TOBACCO_USE: 1

## 2021-07-19 NOTE — H&P
Gastroenterology  History and Physical  Outpatient Procedure/s       HISTORY OF PRESENT ILLNESS   Subjective   Rickie Kaminski is a 55 y.o. male who presents for Colon cancer screening [Z12.11].         Patients last colonoscopy was 3/15/2017 for CRC screening with excellent prep to the cecum, that revealed 2 sessile polyps in the rectosigmoid colon, 5 to 8 mm in size, removed by hot snare (Bx SSA, HP), multiple semisessile polyps in the rectum that were biopsied(HP).  Was recommended for repeat colonoscopy in 3 years for surveillance.      PAST MEDICAL AND SURGICAL HISTORY     Past Medical History:   Diagnosis Date   • Alcoholism (CMS/HCC)    • Epilepsy (CMS/HCC)    • Memory loss      History reviewed. No pertinent surgical history.    ALLERGIES     Allergies   Allergen Reactions   • Crab    • Shellfish Containing Products      Crab   • Shellfish Derived        HOME MEDICATIONS     Prior to Admission medications    Medication Sig Start Date End Date Taking? Authorizing Provider   bisacodyL (DULCOLAX) 5 mg EC tablet Take 2 tablets (10 mg total) by mouth 2 (two) times a day for 2 doses. Take day prior to procedure. 2 tabs at 3pm and 2 tabs at 6pm. 5/20/21 5/21/21  Darius Kathleen, DO   cyanocobalamin (VITAMIN B12) 1,000 mcg tablet Take 1 tablet (1,000 mcg total) by mouth daily. 3/18/21 3/18/22  Pura Tovar MD   folic acid (FOLVITE) 1 mg tablet Take 1 tablet (1 mg total) by mouth daily. 3/18/21 3/13/22  Pura Tovar MD   polyethylene glycol (MIRALAX) 17 gram/dose powder Mix 238G with 64oz non-red gatorade, 3pm drink 8oz every 10min until 1/2 gone, 6pm drink 8oz every 10min until complete 5/20/21   Darius Kathleen,    therapeutic multivitamin (THERAGRAN) tablet Take 1 tablet by mouth daily. 3/18/21 3/18/22  Pura Tovar MD   thiamine 100 mg tablet Take 1 tablet (100 mg total) by mouth daily. 3/18/21 3/18/22  Pura Tovar MD        PHYSICAL EXAM   Visit Vitals  /84   Pulse 73  "  Temp 36.9 °C (98.5 °F) (Temporal)   Resp 16   Ht 1.702 m (5' 7\")   Wt 79.4 kg (175 lb)   SpO2 99%   BMI 27.41 kg/m²       General appearance: no distress  Head: normocephalic  Lungs: clear to auscultation anteriorly   Heart: regular rate   Abdomen: soft, non-tender; bowel sounds normal  Neurologic: awake and alert and oriented     ASSESSMENT AND PLAN   Assessment   Colonoscopy today. Further recommendations to follow.         Darius Kathleen,   7/19/2021         "

## 2021-07-19 NOTE — ANESTHESIOLOGIST PRE-PROCEDURE ATTESTATION
Pre-Procedure Patient Identification:  I am the Primary Anesthesiologist and have identified the patient on 07/19/21 at 10:33 AM.   I have confirmed the procedure(s) will be performed by the following surgeon/proceduralist Riaz Butler MD.

## 2021-07-19 NOTE — OP NOTE
_______________________________________________________________________________  Patient Name: Rickie Kaminski        Procedure Date: 7/19/2021 10:25 AM  MRN: [id number]                     Account Number: [id number]  YOB: 1965               Age: 55  Gender: Male                          Note Status: Finalized  Attending MD: GARETH IBARRA MD~FRED  _______________________________________________________________________________  Procedure:             Colonoscopy  Indications:           High risk colon cancer surveillance: Personal history  of colonic polyps  Providers:             GARETH IBARRA MD~FRED (Doctor), CARLOS MANZO DO (Fellow)  Referring MD:          SUSANA PEREIRA MD  Requesting Provider:  Medicines:             See the Anesthesia note for documentation of the  administered medications  Complications:         No immediate complications.  _______________________________________________________________________________  Procedure:             After I obtained informed consent, the scope was  passed under direct vision. Throughout the procedure,  the patient's blood pressure, pulse, and oxygen  saturations were monitored continuously. The  colonoscope was introduced through the anus and  advanced to the cecum, identified by appendiceal  orifice and ileocecal valve. The colonoscopy was  performed without difficulty. The patient tolerated  the procedure well. The quality of the bowel  preparation was good.  Findings:  The perianal and digital rectal examinations were normal.  An area of mildly nodular mucosa was found in the sigmoid colon.  The exam was otherwise without abnormality.  Internal hemorrhoids were found during retroflexion. The hemorrhoids  were small.  Impression:            - Nodular mucosa in the sigmoid colon.  - The examination was otherwise normal.  - Internal hemorrhoids.  - No specimens collected.  Recommendation:        - Discharge patient to home.  -  Repeat colonoscopy in 10 years.  Procedure Code(s):     --- Professional ---  44836, Colonoscopy, flexible; diagnostic, including  collection of specimen(s) by brushing or washing, when  performed (separate procedure)  Diagnosis Code(s):     --- Professional ---  Z86.010, Personal history of colonic polyps  K63.89, Other specified diseases of intestine  K64.8, Other hemorrhoids  CPT copyright 2020 [employer]. All rights reserved.  The codes documented in this report are preliminary and upon  review may  be revised to meet current compliance requirements.  Attending Participation:  I was present and participated during the entire procedure, including  non-key portions.  _____________________  GARETH IBARRA MD~FRED  7/19/2021 11:25:59 AM  This report has been signed electronically.  Number of Addenda: 0  Note Initiated On: 7/19/2021 10:25 AM

## 2021-07-19 NOTE — ANESTHESIA PREPROCEDURE EVALUATION
Relevant Problems   GASTROINTESTINAL   (+) Fatty liver due to alcoholism       Anesthesia ROS/MED HX    Anesthesia History    Previous anesthetics  No history of anesthetic complications  Pulmonary    history of tobacco use   Sleep apnea Not CPAP Compliant  Neuro/Psych    seizures   Substance abuse  Cardiovascular   Covid19 Test Reviewed  ROS/MED HX Comments:    Pulmonary: MJ Smoker   Neurology/Psychology: 40 ounces malt liquor per day    Hx epilepsy- childhood, no longer an issue since 1970s       History reviewed. No pertinent surgical history.    Physical Exam    Airway   Mallampati: II   TM distance: >3 FB   Neck ROM: full  Cardiovascular - normal   Rhythm: regular   Rate: normalPulmonary - normal   clear to auscultation    Anesthesia  Exam Comments:   Exam Airway: Fairly small mouth opening     Dental: Denies loose, both front teeth chipped, poor dentition        Anesthesia Plan    Plan: general    Technique: total IV anesthesia     Lines and Monitors: PIV     Airway: natural airway / supplemental oxygen   ASA 3  Anesthetic plan and risks discussed with: patient  Induction:    intravenous   Postop Plan:   Patient Disposition: phase II then home   Pain Management: IV analgesics

## 2021-07-20 ENCOUNTER — HOSPITAL ENCOUNTER (OUTPATIENT)
Dept: SLEEP MEDICINE | Facility: HOSPITAL | Age: 56
Discharge: HOME | End: 2021-07-20
Attending: FAMILY MEDICINE
Payer: COMMERCIAL

## 2021-07-20 DIAGNOSIS — R06.83 SNORING: ICD-10-CM

## 2021-07-20 PROCEDURE — 95810 POLYSOM 6/> YRS 4/> PARAM: CPT

## 2021-07-21 NOTE — ANESTHESIA POSTPROCEDURE EVALUATION
Patient: Rickie Kaminski    Procedure Summary     Date: 07/19/21 Room / Location: [facility] 1 (A) / [facility]    Anesthesia Start: 1041 Anesthesia Stop: 1116    Procedure: NH COLONOSCOPY FLX DX W/COLLJ SPEC WHEN PFRMD [72194 (CPT®)] (N/A Anus) Diagnosis:       Colon cancer screening      (Colon cancer screening [Z12.11])    Providers: Riaz Butler MD Responsible Provider: Marley Hager MD    Anesthesia Type: general ASA Status: 3          Anesthesia Type: general  PACU Vitals  7/19/2021 1108 - 7/19/2021 1208      7/19/2021  1112 7/19/2021  1130 7/19/2021  1145       BP:  105/68  123/85  139/87     Temp:  36.2 °C (97.1 °F)  --  --     Pulse:  72  61  60     Resp:  17  19  18     SpO2:  100 %  100 %  100 %             Anesthesia Post Evaluation    Pain management: adequate  Patient participation: complete - patient participated  Level of consciousness: awake and alert  Cardiovascular status: acceptable  Airway Patency: adequate  Respiratory status: acceptable  Hydration status: acceptable  Anesthetic complications: no

## 2021-08-03 ENCOUNTER — TELEPHONE (OUTPATIENT)
Dept: INTERNAL MEDICINE | Facility: HOSPITAL | Age: 56
End: 2021-08-03

## 2021-08-03 DIAGNOSIS — G47.33 OSA (OBSTRUCTIVE SLEEP APNEA): Primary | ICD-10-CM

## 2021-08-03 NOTE — TELEPHONE ENCOUNTER
I called Ina, patient's wife and caregiver.  Rickie does have at least moderate obstructive sleep apnea on his sleep study completed on 7/20/2021.  I gave Ina a referral for Rickie to see Dr. Adams and she will call his office and schedule an appointment to discuss treatment options for obstructive sleep apnea.

## 2021-10-01 ENCOUNTER — OFFICE VISIT (OUTPATIENT)
Dept: INTERNAL MEDICINE | Facility: HOSPITAL | Age: 56
End: 2021-10-01
Attending: FAMILY MEDICINE
Payer: COMMERCIAL

## 2021-10-01 VITALS
HEIGHT: 67 IN | TEMPERATURE: 97.3 F | WEIGHT: 163 LBS | RESPIRATION RATE: 18 BRPM | BODY MASS INDEX: 25.58 KG/M2 | DIASTOLIC BLOOD PRESSURE: 88 MMHG | OXYGEN SATURATION: 99 % | HEART RATE: 92 BPM | SYSTOLIC BLOOD PRESSURE: 124 MMHG

## 2021-10-01 DIAGNOSIS — R41.89 COGNITIVE DECLINE: ICD-10-CM

## 2021-10-01 DIAGNOSIS — F10.29 ALCOHOL DEPENDENCE WITH UNSPECIFIED ALCOHOL-INDUCED DISORDER: ICD-10-CM

## 2021-10-01 DIAGNOSIS — Z23 NEED FOR INFLUENZA VACCINATION: Primary | ICD-10-CM

## 2021-10-01 DIAGNOSIS — M79.675 TOE PAIN, LEFT: ICD-10-CM

## 2021-10-01 PROCEDURE — 3E0234Z INTRODUCTION OF SERUM, TOXOID AND VACCINE INTO MUSCLE, PERCUTANEOUS APPROACH: ICD-10-PCS | Performed by: FAMILY MEDICINE

## 2021-10-01 PROCEDURE — 90686 IIV4 VACC NO PRSV 0.5 ML IM: CPT | Performed by: FAMILY MEDICINE

## 2021-10-01 PROCEDURE — 99213 OFFICE O/P EST LOW 20 MIN: CPT | Mod: 25 | Performed by: FAMILY MEDICINE

## 2021-10-01 PROCEDURE — 3008F BODY MASS INDEX DOCD: CPT | Performed by: FAMILY MEDICINE

## 2021-10-01 PROCEDURE — 99900024 HB NONBILLABLE HOSPITAL CLINIC SERVICE: Mod: 25 | Performed by: FAMILY MEDICINE

## 2021-10-01 ASSESSMENT — PAIN SCALES - GENERAL: PAINLEVEL: 7

## 2021-10-01 NOTE — PATIENT INSTRUCTIONS
11am appt with podiatry    [address] [place] [address] rd, [address] [id number]    See the pulmonologist for the sleep apnea treatment and the neurologist as scheduled.     We will keep an eye on your blood pressure.     It was good to see you today. Let me know if there is anything I can do to help in this very hard time.

## 2021-10-01 NOTE — PROGRESS NOTES
SUBJECTIVE    Rickie Kaminski is a 56 y.o. male who  has a past medical history of Alcoholism (CMS/HCC), Epilepsy (CMS/HCC), and Memory loss. who presents for routine follow up    He comes today as usual with his wife, Ina    They tell me that things have been hard because Rickie's mother is critically ill with Covid and will likely die in the next few days.  I offered my condolences and support    Today reports pain in left great toe that started yesterday and is moderate to severe.  No history of gout, no trauma.  Pain is at the edge of the toenail and the area is slightly swollen and red    He has appt with sleep medicine later this month.  Had a sleep study that showed obstructive sleep apnea    He has cognitive decline and memory issues of unclear etiology but possibly related to chronic excessive alcohol use.  He is following with neurology at [place] and has an appointment later this month for follow-up    Wants flu shot today    Wt Readings from Last 15 Encounters:   10/01/21 73.9 kg (163 lb)   07/19/21 79.4 kg (175 lb)   06/30/21 73.5 kg (162 lb)   05/20/21 71.7 kg (158 lb)   04/03/21 76.2 kg (168 lb)   03/18/21 76.2 kg (168 lb)   10/20/20 76.2 kg (168 lb)   01/17/20 72.6 kg (160 lb)   08/15/18 77.1 kg (170 lb)   07/10/18 78.5 kg (173 lb)     Past Medical History:   Diagnosis Date   • Alcoholism (CMS/HCC)    • Epilepsy (CMS/HCC)    • Memory loss      Social History     Tobacco Use   • Smoking status: Never Smoker   • Smokeless tobacco: Never Used   Substance Use Topics   • Alcohol use: No   • Drug use: No     Current Medications:   Current Outpatient Medications:   •  bisacodyL (DULCOLAX) 5 mg EC tablet, Take 2 tablets (10 mg total) by mouth 2 (two) times a day for 2 doses. Take day prior to procedure. 2 tabs at 3pm and 2 tabs at 6pm., Disp: 4 tablet, Rfl: 0  •  cyanocobalamin (VITAMIN B12) 1,000 mcg tablet, Take 1 tablet (1,000 mcg total) by mouth daily., Disp: 90 tablet, Rfl: 3  •  folic acid (FOLVITE) 1  "mg tablet, Take 1 tablet (1 mg total) by mouth daily., Disp: 90 tablet, Rfl: 3  •  polyethylene glycol (MIRALAX) 17 gram/dose powder, Mix 238G with 64oz non-red gatorade, 3pm drink 8oz every 10min until 1/2 gone, 6pm drink 8oz every 10min until complete, Disp: 238 g, Rfl: 0  •  therapeutic multivitamin (THERAGRAN) tablet, Take 1 tablet by mouth daily., Disp: 90 tablet, Rfl: 3  •  thiamine 100 mg tablet, Take 1 tablet (100 mg total) by mouth daily., Disp: 90 tablet, Rfl: 3    Allergies: Crab, Shellfish containing products, and Shellfish derived    Objective   Physical Exam:  Visit Vitals  /88   Pulse 92   Temp 36.3 °C (97.3 °F) (Skin)   Resp 18   Ht 1.702 m (5' 7\")   Wt 73.9 kg (163 lb)   SpO2 99%   BMI 25.53 kg/m²      Physical Exam  Constitutional:       General: He is not in acute distress.     Appearance: He is not ill-appearing.   Eyes:      General: No scleral icterus.  Cardiovascular:      Rate and Rhythm: Normal rate and regular rhythm.   Pulmonary:      Effort: Pulmonary effort is normal.      Breath sounds: Normal breath sounds.   Feet:      Left foot:      Toenail Condition: Left toenails are abnormally thick. Fungal disease present.     Comments: Slight redness and swelling without drainage at left great toe distally at toenail edge  No TTP, redness or swelling at left first MTP  Neurological:      Mental Status: He is alert. Mental status is at baseline.         Assessment & Plan:  Problem List Items Addressed This Visit        Unprioritized    Alcohol dependence (CMS/HCC)     Declines treatment. Due to critical illness in his mother we didn't talk about this much today. Will continue to discuss, likely contributing to his cognitive issues. Continue vitamin supplements         Cognitive decline     He is following with neurology and will see his neurologist for follow up later this month. May be related to chronic alcohol use.          Toe pain, left     Looks like an ingrown toenail, has overgrown " and thickened toenails. I made them an appointment with podiatry later this morning for evaluation. Not consistent with gout or trauma. No evidence of active infection         Relevant Orders    Ambulatory referral to Podiatry      Other Visit Diagnoses     Need for influenza vaccination    -  Primary    Relevant Orders    Influenza vaccine quadrivalent preservative free 6 mon and older IM ([facility]) (Completed)          Return in about 3 months (around 1/1/2022).  Rebecca Blakely MD

## 2021-10-01 NOTE — ASSESSMENT & PLAN NOTE
Declines treatment. Due to critical illness in his mother we didn't talk about this much today. Will continue to discuss, likely contributing to his cognitive issues. Continue vitamin supplements

## 2021-10-01 NOTE — ASSESSMENT & PLAN NOTE
Looks like an ingrown toenail, has overgrown and thickened toenails. I made them an appointment with podiatry later this morning for evaluation. Not consistent with gout or trauma. No evidence of active infection   This nurse spoke with patient at this time regarding leaving against medical advice; pt voices understanding regarding the risks and states that he will come back if he has any further problems. This nurse also informed pt that we can transfer him to another facility and he declines at this time.       Gino Youssef RN  03/28/19 1092

## 2021-10-01 NOTE — ASSESSMENT & PLAN NOTE
He is following with neurology and will see his neurologist for follow up later this month. May be related to chronic alcohol use.

## 2022-01-04 ENCOUNTER — OFFICE VISIT (OUTPATIENT)
Dept: INTERNAL MEDICINE | Facility: HOSPITAL | Age: 57
End: 2022-01-04
Attending: FAMILY MEDICINE
Payer: COMMERCIAL

## 2022-01-04 VITALS
BODY MASS INDEX: 21.98 KG/M2 | DIASTOLIC BLOOD PRESSURE: 67 MMHG | SYSTOLIC BLOOD PRESSURE: 101 MMHG | HEART RATE: 85 BPM | WEIGHT: 145 LBS | HEIGHT: 68 IN | TEMPERATURE: 97 F | OXYGEN SATURATION: 96 %

## 2022-01-04 DIAGNOSIS — F10.10 ALCOHOL ABUSE: ICD-10-CM

## 2022-01-04 DIAGNOSIS — R10.9 RIGHT SIDED ABDOMINAL PAIN: ICD-10-CM

## 2022-01-04 DIAGNOSIS — F10.29 ALCOHOL DEPENDENCE WITH UNSPECIFIED ALCOHOL-INDUCED DISORDER: Primary | ICD-10-CM

## 2022-01-04 DIAGNOSIS — G47.33 OBSTRUCTIVE SLEEP APNEA: ICD-10-CM

## 2022-01-04 DIAGNOSIS — E53.8 B12 DEFICIENCY: ICD-10-CM

## 2022-01-04 DIAGNOSIS — F32.9 REACTIVE DEPRESSION: ICD-10-CM

## 2022-01-04 DIAGNOSIS — R41.89 COGNITIVE DECLINE: ICD-10-CM

## 2022-01-04 PROBLEM — S82.54XD CLOSED NONDISPLACED FRACTURE OF MEDIAL MALLEOLUS OF RIGHT TIBIA WITH ROUTINE HEALING: Status: RESOLVED | Noted: 2018-07-12 | Resolved: 2022-01-04

## 2022-01-04 PROBLEM — R06.83 SNORING: Status: RESOLVED | Noted: 2021-07-01 | Resolved: 2022-01-04

## 2022-01-04 PROBLEM — M79.675 TOE PAIN, LEFT: Status: RESOLVED | Noted: 2021-10-01 | Resolved: 2022-01-04

## 2022-01-04 PROCEDURE — 3008F BODY MASS INDEX DOCD: CPT | Performed by: FAMILY MEDICINE

## 2022-01-04 PROCEDURE — 99214 OFFICE O/P EST MOD 30 MIN: CPT | Performed by: FAMILY MEDICINE

## 2022-01-04 PROCEDURE — G0463 HOSPITAL OUTPT CLINIC VISIT: HCPCS | Performed by: FAMILY MEDICINE

## 2022-01-04 RX ORDER — MIRTAZAPINE 7.5 MG/1
7.5 TABLET, FILM COATED ORAL NIGHTLY
Qty: 30 TABLET | Refills: 3 | Status: SHIPPED | OUTPATIENT
Start: 2022-01-04 | End: 2022-02-28

## 2022-01-04 RX ORDER — LANOLIN ALCOHOL/MO/W.PET/CERES
100 CREAM (GRAM) TOPICAL DAILY
Qty: 90 TABLET | Refills: 3 | Status: SHIPPED | OUTPATIENT
Start: 2022-01-04 | End: 2023-06-13 | Stop reason: SDUPTHER

## 2022-01-04 RX ORDER — FOLIC ACID 1 MG/1
1 TABLET ORAL DAILY
Qty: 90 TABLET | Refills: 3 | Status: SHIPPED | OUTPATIENT
Start: 2022-01-04 | End: 2023-06-13 | Stop reason: SDUPTHER

## 2022-01-04 RX ORDER — LANOLIN ALCOHOL/MO/W.PET/CERES
1000 CREAM (GRAM) TOPICAL DAILY
Qty: 90 TABLET | Refills: 3 | Status: SHIPPED | OUTPATIENT
Start: 2022-01-04 | End: 2023-06-13 | Stop reason: SDUPTHER

## 2022-01-04 ASSESSMENT — PATIENT HEALTH QUESTIONNAIRE - PHQ9: SUM OF ALL RESPONSES TO PHQ9 QUESTIONS 1 & 2: 0

## 2022-01-04 ASSESSMENT — PAIN SCALES - GENERAL: PAINLEVEL: 5

## 2022-01-04 NOTE — ASSESSMENT & PLAN NOTE
Patient wife feel his mood is worse and appetite is down as a result of the loss of his mother.  We discussed this may just take time, there is no medication for grief however may benefit from small dose mirtazapine, will start at 7.5 mg nightly for now.  Follow-up in 3 months

## 2022-01-04 NOTE — ASSESSMENT & PLAN NOTE
Was evaluated by neurology at [facility], Dr. Stroud, who advised his cognitive dysfunction is likely result of chronic heavy alcohol use.  He advised to follow-up as needed.  Patient is taking his vitamins, including B12, folate, thiamine, MVI.  Symptoms are stable.  Wife is primary caregiver.  We discussed again the importance of alcohol cessation, patient declines treatment for alcohol dependence at this time.  We will have social work reach out to wife for supportive care and resources.

## 2022-01-04 NOTE — ASSESSMENT & PLAN NOTE
Previously drinking half a gallon of vodka a day, currently drinking malt liquor, not quantified.  Patient and wife endorse very heavy daily alcohol use.  Not amenable to referral for alcohol dependence treatment program at this time.

## 2022-01-04 NOTE — ASSESSMENT & PLAN NOTE
Continue supplementation and recheck methylmalonic acid, CBC, folate due to ongoing heavy alcohol use

## 2022-01-04 NOTE — ASSESSMENT & PLAN NOTE
Reassuring exam, may be related to hepatic steatosis?  We will check labs including CMP.  Do not suspect gallbladder pathology at this time based on exam and symptoms.  May be traumatic given frequent falls.

## 2022-01-04 NOTE — PROGRESS NOTES
SUBJECTIVE    Rickie Kaminski is a 56 y.o. male who  has a past medical history of Alcoholism (CMS/HCC), Epilepsy (CMS/HCC), and Memory loss. who presents for routine follow up.     He comes in with his wife who reports he is eating less and losing weight.  Patient thinks he is depressed after his mother passed away, there was also significant stress within the family with disagreements among his siblings.    He continues to drink heavily- malt liquor every day, can't quantify for me. Used to drink a half gallon of vodka a day.  His wife, Ina, is very frustrated with his alcohol use.  She is a bit at wits end.  Patient himself does not want to stop drinking and declines rehab.  He has significant cognitive dysfunction, saw neurology and had extensive evaluation, MRI consistent with likely alcohol related cognitive decline.    Pt reports a pain on his right side.  His wife reports that he has been falling out of the bed and hurting himself often when intoxicated at night.  He is not sure if he fell and hurt his right side.  He reports normal bowel movements.    He is up-to-date Covid vaccine and booster.  He is up-to-date influenza vaccine.  He is up-to-date colonoscopy.    He saw sleep medicine for his FERDINAND, waiting for CPAP to be delivered    Wt Readings from Last 15 Encounters:   01/04/22 65.8 kg (145 lb)   10/01/21 73.9 kg (163 lb)   07/19/21 79.4 kg (175 lb)   06/30/21 73.5 kg (162 lb)   05/20/21 71.7 kg (158 lb)   04/03/21 76.2 kg (168 lb)   03/18/21 76.2 kg (168 lb)   10/20/20 76.2 kg (168 lb)   01/17/20 72.6 kg (160 lb)   08/15/18 77.1 kg (170 lb)   07/10/18 78.5 kg (173 lb)     Past Medical History:   Diagnosis Date   • Alcoholism (CMS/HCC)    • Epilepsy (CMS/HCC)    • Memory loss      Social History     Tobacco Use   • Smoking status: Never Smoker   • Smokeless tobacco: Never Used   Substance Use Topics   • Alcohol use: No   • Drug use: No     PHQ 2 to 9:  Will the patient answer the depression  "questions?: Y    Little interest or pleasure in doing things: Not at all    Feeling down, depressed, or hopeless: Not at all    Depression Risk: 0    Current Medications:   Current Outpatient Medications:   •  cyanocobalamin 1,000 mcg tablet, Take 1 tablet (1,000 mcg total) by mouth daily., Disp: 90 tablet, Rfl: 3  •  folic acid 1 mg tablet, Take 1 tablet (1 mg total) by mouth daily., Disp: 90 tablet, Rfl: 3  •  therapeutic multivitamin tablet, Take 1 tablet by mouth daily., Disp: 90 tablet, Rfl: 3  •  thiamine 100 mg tablet, Take 1 tablet (100 mg total) by mouth daily., Disp: 90 tablet, Rfl: 3  •  bisacodyL (DULCOLAX) 5 mg EC tablet, Take 2 tablets (10 mg total) by mouth 2 (two) times a day for 2 doses. Take day prior to procedure. 2 tabs at 3pm and 2 tabs at 6pm., Disp: 4 tablet, Rfl: 0  •  mirtazapine 7.5 mg tablet, Take 1 tablet (7.5 mg total) by mouth nightly., Disp: 30 tablet, Rfl: 3  •  polyethylene glycol (MIRALAX) 17 gram/dose powder, Mix 238G with 64oz non-red gatorade, 3pm drink 8oz every 10min until 1/2 gone, 6pm drink 8oz every 10min until complete, Disp: 238 g, Rfl: 0    Allergies: Crab, Shellfish containing products, and Shellfish derived    Objective   Physical Exam:  Visit Vitals  /67 (BP Location: Left upper arm, Patient Position: Sitting)   Pulse 85   Temp 36.1 °C (97 °F) (Tympanic)   Ht 1.727 m (5' 8\")   Wt 65.8 kg (145 lb)   SpO2 96%   BMI 22.05 kg/m²      Physical Exam  Constitutional:       General: He is not in acute distress.     Appearance: He is not ill-appearing.   Eyes:      General: No scleral icterus.  Cardiovascular:      Rate and Rhythm: Normal rate and regular rhythm.   Pulmonary:      Effort: Pulmonary effort is normal.      Breath sounds: Normal breath sounds.   Abdominal:      General: There is no distension.      Palpations: Abdomen is soft.      Tenderness: There is no guarding.      Comments: No right upper quadrant tenderness   Musculoskeletal:         General: No " swelling.   Skin:     General: Skin is warm and dry.   Neurological:      Mental Status: He is alert. Mental status is at baseline.         Lab Results   Component Value Date    CHOL 196 12/07/2020    CHOL 196 08/12/2017     Lab Results   Component Value Date    HDL 89 12/07/2020    HDL 87 08/12/2017     Lab Results   Component Value Date    LDLCALC 91 12/07/2020    LDLCALC 85 08/12/2017     Lab Results   Component Value Date    TRIG 93 12/07/2020    TRIG 122 08/12/2017     Lab Results   Component Value Date    GLUCOSE 107 (H) 03/23/2021     03/23/2021    K 4.1 03/23/2021    CO2 26 03/23/2021     03/23/2021    BUN 8 03/23/2021    CREATININE 0.97 03/23/2021     Lab Results   Component Value Date    WBC 3.4 03/23/2021    HGB 15.7 03/23/2021    HCT 48.4 03/23/2021    MCV 75 (L) 03/23/2021     03/23/2021       Assessment & Plan:  Problem List Items Addressed This Visit        Unprioritized    Alcohol dependence (CMS/HCC) - Primary     Previously drinking half a gallon of vodka a day, currently drinking malt liquor, not quantified.  Patient and wife endorse very heavy daily alcohol use.  Not amenable to referral for alcohol dependence treatment program at this time.         Relevant Medications    folic acid 1 mg tablet    therapeutic multivitamin tablet    thiamine 100 mg tablet    mirtazapine 7.5 mg tablet    Cognitive decline     Was evaluated by neurology at [facility], Dr. Stroud, who advised his cognitive dysfunction is likely result of chronic heavy alcohol use.  He advised to follow-up as needed.  Patient is taking his vitamins, including B12, folate, thiamine, MVI.  Symptoms are stable.  Wife is primary caregiver.  We discussed again the importance of alcohol cessation, patient declines treatment for alcohol dependence at this time.  We will have social work reach out to wife for supportive care and resources.         B12 deficiency     Continue supplementation and recheck  methylmalonic acid, CBC, folate due to ongoing heavy alcohol use         Relevant Medications    cyanocobalamin 1,000 mcg tablet    Other Relevant Orders    Methylmalonic Acid, Serum    Folate    Obstructive sleep apnea     Awaiting CPAP delivery, will follow up with sleep medicine         Reactive depression     Patient wife feel his mood is worse and appetite is down as a result of the loss of his mother.  We discussed this may just take time, there is no medication for grief however may benefit from small dose mirtazapine, will start at 7.5 mg nightly for now.  Follow-up in 3 months         Relevant Medications    mirtazapine 7.5 mg tablet    Right sided abdominal pain     Reassuring exam, may be related to hepatic steatosis?  We will check labs including CMP.  Do not suspect gallbladder pathology at this time based on exam and symptoms.  May be traumatic given frequent falls.           Other Visit Diagnoses     Alcohol abuse        Relevant Medications    folic acid 1 mg tablet    therapeutic multivitamin tablet    thiamine 100 mg tablet    mirtazapine 7.5 mg tablet    Other Relevant Orders    Comprehensive metabolic panel    CBC    Methylmalonic Acid, Serum    Folate          Return in about 4 months (around 5/4/2022).  Rebecca Blakely MD

## 2022-02-22 LAB
ALBUMIN SERPL-MCNC: 3.9 G/DL (ref 3.8–4.9)
ALBUMIN/GLOB SERPL: 1 {RATIO} (ref 1.2–2.2)
ALP SERPL-CCNC: 99 IU/L (ref 44–121)
ALT SERPL-CCNC: 29 IU/L (ref 0–44)
AST SERPL-CCNC: 43 IU/L (ref 0–40)
BILIRUB SERPL-MCNC: 0.3 MG/DL (ref 0–1.2)
BUN SERPL-MCNC: 7 MG/DL (ref 6–24)
BUN/CREAT SERPL: 9 (ref 9–20)
CALCIUM SERPL-MCNC: 9.6 MG/DL (ref 8.7–10.2)
CHLORIDE SERPL-SCNC: 100 MMOL/L (ref 96–106)
CO2 SERPL-SCNC: 25 MMOL/L (ref 20–29)
CREAT SERPL-MCNC: 0.77 MG/DL (ref 0.76–1.27)
ERYTHROCYTE [DISTWIDTH] IN BLOOD BY AUTOMATED COUNT: 14.8 % (ref 11.6–15.4)
FOLATE SERPL-MCNC: >20 NG/ML
GLOBULIN SER CALC-MCNC: 4 G/DL (ref 1.5–4.5)
GLUCOSE SERPL-MCNC: 102 MG/DL (ref 65–99)
HCT VFR BLD AUTO: 44.3 % (ref 37.5–51)
HGB BLD-MCNC: 14.3 G/DL (ref 13–17.7)
LAB CORP EGFR IF AFRICN AM: 116 ML/MIN/1.73
LAB CORP EGFR IF NONAFRICN AM: 100 ML/MIN/1.73
MCH RBC QN AUTO: 25.4 PG (ref 26.6–33)
MCHC RBC AUTO-ENTMCNC: 32.3 G/DL (ref 31.5–35.7)
MCV RBC AUTO: 79 FL (ref 79–97)
PLATELET # BLD AUTO: 407 X10E3/UL (ref 150–450)
POTASSIUM SERPL-SCNC: 4.5 MMOL/L (ref 3.5–5.2)
PROT SERPL-MCNC: 7.9 G/DL (ref 6–8.5)
RBC # BLD AUTO: 5.62 X10E6/UL (ref 4.14–5.8)
SODIUM SERPL-SCNC: 140 MMOL/L (ref 134–144)
WBC # BLD AUTO: 4.4 X10E3/UL (ref 3.4–10.8)

## 2022-02-25 LAB — METHYLMALONATE SERPL-SCNC: 264 NMOL/L (ref 0–378)

## 2022-02-27 DIAGNOSIS — F32.9 REACTIVE DEPRESSION: ICD-10-CM

## 2022-02-28 RX ORDER — MIRTAZAPINE 7.5 MG/1
TABLET, FILM COATED ORAL
Qty: 30 TABLET | Refills: 3 | Status: SHIPPED | OUTPATIENT
Start: 2022-02-28 | End: 2022-05-04 | Stop reason: SDUPTHER

## 2022-03-01 ENCOUNTER — TELEPHONE (OUTPATIENT)
Dept: INTERNAL MEDICINE | Facility: HOSPITAL | Age: 57
End: 2022-03-01
Payer: COMMERCIAL

## 2022-05-04 ENCOUNTER — OFFICE VISIT (OUTPATIENT)
Dept: INTERNAL MEDICINE | Facility: HOSPITAL | Age: 57
End: 2022-05-04
Attending: FAMILY MEDICINE
Payer: COMMERCIAL

## 2022-05-04 VITALS
BODY MASS INDEX: 24.52 KG/M2 | HEART RATE: 77 BPM | RESPIRATION RATE: 18 BRPM | WEIGHT: 156.2 LBS | DIASTOLIC BLOOD PRESSURE: 78 MMHG | OXYGEN SATURATION: 100 % | HEIGHT: 67 IN | TEMPERATURE: 97.5 F | SYSTOLIC BLOOD PRESSURE: 118 MMHG

## 2022-05-04 DIAGNOSIS — F32.9 REACTIVE DEPRESSION: ICD-10-CM

## 2022-05-04 DIAGNOSIS — G47.33 OBSTRUCTIVE SLEEP APNEA: ICD-10-CM

## 2022-05-04 DIAGNOSIS — R41.89 COGNITIVE DECLINE: ICD-10-CM

## 2022-05-04 DIAGNOSIS — F10.29 ALCOHOL DEPENDENCE WITH UNSPECIFIED ALCOHOL-INDUCED DISORDER: Primary | ICD-10-CM

## 2022-05-04 PROCEDURE — 99214 OFFICE O/P EST MOD 30 MIN: CPT | Performed by: FAMILY MEDICINE

## 2022-05-04 PROCEDURE — 3008F BODY MASS INDEX DOCD: CPT | Performed by: FAMILY MEDICINE

## 2022-05-04 RX ORDER — MIRTAZAPINE 7.5 MG/1
7.5 TABLET, FILM COATED ORAL NIGHTLY
Qty: 90 TABLET | Refills: 3 | Status: SHIPPED | OUTPATIENT
Start: 2022-05-04 | End: 2022-12-23 | Stop reason: SDUPTHER

## 2022-05-04 ASSESSMENT — PAIN SCALES - GENERAL: PAINLEVEL: 0-NO PAIN

## 2022-05-04 NOTE — ASSESSMENT & PLAN NOTE
Continues to drink too much malt liquor  Used to drink vodka but no current hard liquor  He isn't sure he can or wants to cut back  Alcohol use has caused cognitive impairment  On folate, thiamine and b12 supplements  Recheck labs before next visit

## 2022-05-04 NOTE — PROGRESS NOTES
"SUBJECTIVE    Rickie Kaminski is a 56 y.o. male who  has a past medical history of Alcoholism (CMS/HCC), Epilepsy (CMS/HCC), and Memory loss. who presents for routine follow up    Comes in as usual with his wife, Ina, his primary caregiver    Cognitive decline due to alcohol use     Wife thinks remeron is helping but rodriguez snotice he continues to be forgetful, maybe even more so.    Taking his vitamins as directed    He continues to drink malt liquor- a couple of 24 ounce cans a day  He isn't sure he can cut back     waiting on CPAP delivery      Past Medical History:   Diagnosis Date   • Alcoholism (CMS/HCC)    • Epilepsy (CMS/HCC)    • Memory loss      Social History     Tobacco Use   • Smoking status: Never Smoker   • Smokeless tobacco: Never Used   Substance Use Topics   • Alcohol use: No   • Drug use: Yes     Types: Marijuana     Current Medications:   Current Outpatient Medications:   •  cyanocobalamin 1,000 mcg tablet, Take 1 tablet (1,000 mcg total) by mouth daily., Disp: 90 tablet, Rfl: 3  •  mirtazapine (REMERON) 7.5 mg tablet, Take 1 tablet (7.5 mg total) by mouth nightly., Disp: 90 tablet, Rfl: 3  •  therapeutic multivitamin tablet, Take 1 tablet by mouth daily., Disp: 90 tablet, Rfl: 3  •  thiamine 100 mg tablet, Take 1 tablet (100 mg total) by mouth daily., Disp: 90 tablet, Rfl: 3  •  folic acid 1 mg tablet, Take 1 tablet (1 mg total) by mouth daily., Disp: 90 tablet, Rfl: 3    Allergies: Crab, Shellfish containing products, and Shellfish derived    Objective   Physical Exam:  Visit Vitals  /78   Pulse 77   Temp 36.4 °C (97.5 °F) (Temporal)   Resp 18   Ht 1.702 m (5' 7\")   Wt 70.9 kg (156 lb 3.2 oz)   SpO2 100%   BMI 24.46 kg/m²      Physical Exam  Vitals reviewed.   Eyes:      General: No scleral icterus.  Cardiovascular:      Rate and Rhythm: Normal rate and regular rhythm.   Pulmonary:      Effort: Pulmonary effort is normal.      Breath sounds: Normal breath sounds.   Abdominal:      " General: There is no distension.      Palpations: Abdomen is soft.      Tenderness: There is no abdominal tenderness. There is no guarding.   Skin:     General: Skin is warm and dry.   Neurological:      Mental Status: He is alert and oriented to person, place, and time.   Psychiatric:         Mood and Affect: Mood normal.         Behavior: Behavior normal.         Thought Content: Thought content normal.         Lab Results   Component Value Date    GLUCOSE 102 (H) 02/21/2022     02/21/2022    K 4.5 02/21/2022    CO2 25 02/21/2022     02/21/2022    BUN 7 02/21/2022    CREATININE 0.77 02/21/2022     Lab Results   Component Value Date    ALT 29 02/21/2022    AST 43 (H) 02/21/2022    ALKPHOS 99 02/21/2022    BILITOT 0.3 02/21/2022     Lab Results   Component Value Date    INR 1.0 03/23/2021    INR 1.0 01/17/2020    INR 1.1 08/15/2018     Lab Results   Component Value Date    WBC 4.4 02/21/2022    HGB 14.3 02/21/2022    HCT 44.3 02/21/2022    MCV 79 02/21/2022     02/21/2022     Lab Results   Component Value Date    HGBA1C 5.6 12/07/2020     Lab Results   Component Value Date    CHOL 196 12/07/2020    CHOL 196 08/12/2017     Lab Results   Component Value Date    HDL 89 12/07/2020    HDL 87 08/12/2017     Lab Results   Component Value Date    LDLCALC 91 12/07/2020    LDLCALC 85 08/12/2017     Lab Results   Component Value Date    TRIG 93 12/07/2020    TRIG 122 08/12/2017     Assessment & Plan:  Problem List Items Addressed This Visit        Unprioritized    Alcohol dependence (CMS/HCC) - Primary     Continues to drink too much malt liquor  Used to drink vodka but no current hard liquor  He isn't sure he can or wants to cut back  Alcohol use has caused cognitive impairment  On folate, thiamine and b12 supplements  Recheck labs before next visit           Relevant Orders    Comprehensive metabolic panel    Protime-INR    Lipid panel    Folate    Vitamin B12    CBC and differential    Cognitive decline      Evaluated by neurologist at [place]   Cause is alcohol related  Advised alcohol cessation, pt is not willing           Obstructive sleep apnea     CPAP ordered by sleep physician  Waiting for delivery           Reactive depression     remeron 7.5mg nightly seems to have been helpful, continue           Relevant Medications    mirtazapine (REMERON) 7.5 mg tablet          Return in about 6 months (around 11/4/2022).  Rebecca Blakely MD

## 2022-05-04 NOTE — LETTER
May 4, 2022         Patient: Rickie Kaminski  YOB: 1965  Date of Visit: 5/4/2022    To Whom it May Concern:    Rickie Kaminski is under my care, I am his primary care provider. His wife, Ina Kaminski, cares for Rickie at night. Rickie has several worsening chronic medical problems that require care overnight and thus Ina would not be able to work while caring for him.     If you have any questions or concerns, please don't hesitate to call.         Sincerely,           Rebecca Blakely MD

## 2022-05-04 NOTE — ASSESSMENT & PLAN NOTE
Evaluated by neurologist at [place]   Cause is alcohol related  Advised alcohol cessation, pt is not willing

## 2022-05-04 NOTE — PATIENT INSTRUCTIONS
Get blood work before next appointment    Continue vitamins and remeron    Come back to see me in the fall    Or earlier as needed

## 2022-05-09 ENCOUNTER — TELEPHONE (OUTPATIENT)
Dept: INTERNAL MEDICINE | Facility: HOSPITAL | Age: 57
End: 2022-05-09
Payer: COMMERCIAL

## 2022-05-09 NOTE — TELEPHONE ENCOUNTER
Pt wife called in stating that you completed a letter for the pt, and she needs to speak with you regarding this letter. She would not disclose anything regarding this letter.

## 2022-05-16 ENCOUNTER — TELEPHONE (OUTPATIENT)
Dept: INTERNAL MEDICINE | Facility: HOSPITAL | Age: 57
End: 2022-05-16
Payer: COMMERCIAL

## 2022-05-16 NOTE — TELEPHONE ENCOUNTER
Ina Kaminski dropped off forms on behalf of Rickie Kaminski. Placed in pcp bin in [facility].

## 2022-05-18 NOTE — TELEPHONE ENCOUNTER
I faxed [place] forms to [facility] @ [phone] and scanned form into patient chart

## 2022-05-18 NOTE — TELEPHONE ENCOUNTER
I called Ina, we discussed her FMLA forms, I completed the forms, emailed them back to her and placed them in the bin to be faxed

## 2022-09-27 LAB
ALBUMIN SERPL-MCNC: 4.2 G/DL (ref 3.8–4.9)
ALBUMIN/GLOB SERPL: 1.1 {RATIO} (ref 1.2–2.2)
ALP SERPL-CCNC: 74 IU/L (ref 44–121)
ALT SERPL-CCNC: 14 IU/L (ref 0–44)
AST SERPL-CCNC: 22 IU/L (ref 0–40)
BASOPHILS # BLD AUTO: 0 X10E3/UL (ref 0–0.2)
BASOPHILS NFR BLD AUTO: 1 %
BILIRUB SERPL-MCNC: 0.5 MG/DL (ref 0–1.2)
BUN SERPL-MCNC: 5 MG/DL (ref 6–24)
BUN/CREAT SERPL: 5 (ref 9–20)
CALCIUM SERPL-MCNC: 9.7 MG/DL (ref 8.7–10.2)
CHLORIDE SERPL-SCNC: 105 MMOL/L (ref 96–106)
CHOLEST SERPL-MCNC: 182 MG/DL (ref 100–199)
CO2 SERPL-SCNC: 27 MMOL/L (ref 20–29)
CREAT SERPL-MCNC: 0.99 MG/DL (ref 0.76–1.27)
EGFRCR SERPLBLD CKD-EPI 2021: 88 ML/MIN/1.73
EOSINOPHIL # BLD AUTO: 0.1 X10E3/UL (ref 0–0.4)
EOSINOPHIL NFR BLD AUTO: 2 %
ERYTHROCYTE [DISTWIDTH] IN BLOOD BY AUTOMATED COUNT: 15.7 % (ref 11.6–15.4)
FOLATE SERPL-MCNC: 7.4 NG/ML
GLOBULIN SER CALC-MCNC: 3.9 G/DL (ref 1.5–4.5)
GLUCOSE SERPL-MCNC: 96 MG/DL (ref 70–99)
HCT VFR BLD AUTO: 49.8 % (ref 37.5–51)
HDLC SERPL-MCNC: 93 MG/DL
HGB BLD-MCNC: 16.3 G/DL (ref 13–17.7)
IMM GRANULOCYTES # BLD AUTO: 0 X10E3/UL (ref 0–0.1)
IMM GRANULOCYTES NFR BLD AUTO: 0 %
INR PPP: 1.1 (ref 0.9–1.2)
LDLC SERPL CALC-MCNC: 74 MG/DL (ref 0–99)
LYMPHOCYTES # BLD AUTO: 1.7 X10E3/UL (ref 0.7–3.1)
LYMPHOCYTES NFR BLD AUTO: 57 %
MCH RBC QN AUTO: 25 PG (ref 26.6–33)
MCHC RBC AUTO-ENTMCNC: 32.7 G/DL (ref 31.5–35.7)
MCV RBC AUTO: 76 FL (ref 79–97)
MONOCYTES # BLD AUTO: 0.3 X10E3/UL (ref 0.1–0.9)
MONOCYTES NFR BLD AUTO: 12 %
MORPHOLOGY BLD-IMP: ABNORMAL
NEUTROPHILS # BLD AUTO: 0.8 X10E3/UL (ref 1.4–7)
NEUTROPHILS NFR BLD AUTO: 28 %
PLATELET # BLD AUTO: 279 X10E3/UL (ref 150–450)
POTASSIUM SERPL-SCNC: 4.4 MMOL/L (ref 3.5–5.2)
PROT SERPL-MCNC: 8.1 G/DL (ref 6–8.5)
PROTHROMBIN TIME: 11.3 SEC (ref 9.1–12)
RBC # BLD AUTO: 6.52 X10E6/UL (ref 4.14–5.8)
SODIUM SERPL-SCNC: 146 MMOL/L (ref 134–144)
TRIGL SERPL-MCNC: 86 MG/DL (ref 0–149)
VIT B12 SERPL-MCNC: 271 PG/ML (ref 232–1245)
VLDLC SERPL CALC-MCNC: 15 MG/DL (ref 5–40)
WBC # BLD AUTO: 2.9 X10E3/UL (ref 3.4–10.8)

## 2022-10-04 ENCOUNTER — OFFICE VISIT (OUTPATIENT)
Dept: INTERNAL MEDICINE | Facility: HOSPITAL | Age: 57
End: 2022-10-04
Attending: FAMILY MEDICINE
Payer: COMMERCIAL

## 2022-10-04 VITALS
OXYGEN SATURATION: 99 % | BODY MASS INDEX: 22.64 KG/M2 | HEART RATE: 73 BPM | DIASTOLIC BLOOD PRESSURE: 76 MMHG | RESPIRATION RATE: 18 BRPM | TEMPERATURE: 96.8 F | WEIGHT: 149.4 LBS | SYSTOLIC BLOOD PRESSURE: 128 MMHG | HEIGHT: 68 IN

## 2022-10-04 DIAGNOSIS — Z23 NEED FOR INFLUENZA VACCINATION: Primary | ICD-10-CM

## 2022-10-04 DIAGNOSIS — E53.8 B12 DEFICIENCY: ICD-10-CM

## 2022-10-04 DIAGNOSIS — M54.2 CERVICALGIA: ICD-10-CM

## 2022-10-04 DIAGNOSIS — F10.29 ALCOHOL DEPENDENCE WITH UNSPECIFIED ALCOHOL-INDUCED DISORDER: ICD-10-CM

## 2022-10-04 PROBLEM — R10.9 RIGHT SIDED ABDOMINAL PAIN: Status: RESOLVED | Noted: 2022-01-04 | Resolved: 2022-10-04

## 2022-10-04 PROCEDURE — 99900024 HB NONBILLABLE HOSPITAL CLINIC SERVICE: Mod: 25 | Performed by: FAMILY MEDICINE

## 2022-10-04 PROCEDURE — 90471 IMMUNIZATION ADMIN: CPT | Performed by: FAMILY MEDICINE

## 2022-10-04 PROCEDURE — 99214 OFFICE O/P EST MOD 30 MIN: CPT | Mod: 25 | Performed by: FAMILY MEDICINE

## 2022-10-04 PROCEDURE — 3008F BODY MASS INDEX DOCD: CPT | Performed by: FAMILY MEDICINE

## 2022-10-04 PROCEDURE — 3E0234Z INTRODUCTION OF SERUM, TOXOID AND VACCINE INTO MUSCLE, PERCUTANEOUS APPROACH: ICD-10-PCS | Performed by: FAMILY MEDICINE

## 2022-10-04 ASSESSMENT — PAIN SCALES - GENERAL: PAINLEVEL: 0-NO PAIN

## 2022-10-04 NOTE — ASSESSMENT & PLAN NOTE
Acute muscular neck pain consistent with spasm/strain  Conservative care, let me know if this doesn't improve over the next week

## 2022-10-04 NOTE — PROGRESS NOTES
SUBJECTIVE    Rickie Kaminski is a 57 y.o. male who  has a past medical history of Alcoholism (CMS/HCC), Epilepsy (CMS/HCC), and Memory loss. who presents for routine follow up    Comes in as usual with his wife, Ina, his primary caregiver     He is having some pain in his neck over the last few days, woke up with it, thinks he slept on it funny     Admits he hasn't been taking his vitamins as directed     Otherwise he tells me he is doing well     He has cognitive decline and memory loss related to alcohol use and is not ammenable to stopping alcohol    Agrees to flu shot today    UTD covid vaccines, shingrix  UTD colon cancer screening    Wt Readings from Last 15 Encounters:   10/04/22 67.8 kg (149 lb 6.4 oz)   05/04/22 70.9 kg (156 lb 3.2 oz)   01/04/22 65.8 kg (145 lb)   10/01/21 73.9 kg (163 lb)   07/19/21 79.4 kg (175 lb)   06/30/21 73.5 kg (162 lb)   05/20/21 71.7 kg (158 lb)   04/03/21 76.2 kg (168 lb)   03/18/21 76.2 kg (168 lb)   10/20/20 76.2 kg (168 lb)   01/17/20 72.6 kg (160 lb)   08/15/18 77.1 kg (170 lb)   07/10/18 78.5 kg (173 lb)     Past Medical History:   Diagnosis Date    Alcoholism (CMS/HCC)     Epilepsy (CMS/HCC)     Memory loss      Social History     Tobacco Use    Smoking status: Never Smoker    Smokeless tobacco: Never Used   Vaping Use    Vaping Use: Never used   Substance Use Topics    Alcohol use: No    Drug use: Yes     Types: Marijuana     Current Medications:   Current Outpatient Medications:     cyanocobalamin 1,000 mcg tablet, Take 1 tablet (1,000 mcg total) by mouth daily., Disp: 90 tablet, Rfl: 3    folic acid 1 mg tablet, Take 1 tablet (1 mg total) by mouth daily., Disp: 90 tablet, Rfl: 3    mirtazapine (REMERON) 7.5 mg tablet, Take 1 tablet (7.5 mg total) by mouth nightly., Disp: 90 tablet, Rfl: 3    therapeutic multivitamin tablet, Take 1 tablet by mouth daily., Disp: 90 tablet, Rfl: 3    thiamine 100 mg tablet, Take 1 tablet (100 mg total) by mouth daily.,  "Disp: 90 tablet, Rfl: 3    Allergies: Crab, Shellfish containing products, and Shellfish derived    Objective   Physical Exam:  Visit Vitals  /76   Pulse 73   Temp (!) 36 °C (96.8 °F) (Temporal)   Resp 18   Ht 1.727 m (5' 8\")   Wt 67.8 kg (149 lb 6.4 oz)   SpO2 99%   BMI 22.72 kg/m²      Physical Exam  Vitals reviewed.   Eyes:      General: No scleral icterus.  Cardiovascular:      Rate and Rhythm: Normal rate and regular rhythm.   Pulmonary:      Effort: Pulmonary effort is normal.      Breath sounds: Normal breath sounds.   Neurological:      Mental Status: He is alert and oriented to person, place, and time. Mental status is at baseline.   Psychiatric:         Mood and Affect: Mood normal.         Behavior: Behavior normal.         Assessment & Plan:  Problem List Items Addressed This Visit        Unprioritized    Alcohol dependence (CMS/HCC)     I asked him to restart his vitamins  He agrees  He is not amenable to stopping alcohol despite cognitive decline  His wife cares for him             B12 deficiency     He will restart oral b12 supplement           Cervicalgia     Acute muscular neck pain consistent with spasm/strain  Conservative care, let me know if this doesn't improve over the next week             Other Visit Diagnoses     Need for influenza vaccination    -  Primary    Relevant Orders    Influenza vaccine quadrivalent preservative free 6 mon and older IM ([facility] L[facility] Only) (Completed)          Return in about 4 months (around 2/4/2023).  Rebecca Blakely MD      "

## 2022-10-04 NOTE — ASSESSMENT & PLAN NOTE
I asked him to restart his vitamins  He agrees  He is not amenable to stopping alcohol despite cognitive decline  His wife cares for him

## 2022-12-23 DIAGNOSIS — F32.9 REACTIVE DEPRESSION: ICD-10-CM

## 2022-12-23 RX ORDER — MIRTAZAPINE 7.5 MG/1
7.5 TABLET, FILM COATED ORAL NIGHTLY
Qty: 90 TABLET | Refills: 3 | Status: SHIPPED | OUTPATIENT
Start: 2022-12-23 | End: 2024-07-24

## 2022-12-23 NOTE — TELEPHONE ENCOUNTER
Medicine Refill Request    Last Office: 10/4/2022   Last Consult Visit: Visit date not found  Last Telemedicine Visit: Visit date not found    Next Appointment: 2/8/2023      Current Outpatient Medications:   •  cyanocobalamin 1,000 mcg tablet, Take 1 tablet (1,000 mcg total) by mouth daily., Disp: 90 tablet, Rfl: 3  •  folic acid 1 mg tablet, Take 1 tablet (1 mg total) by mouth daily., Disp: 90 tablet, Rfl: 3  •  mirtazapine (REMERON) 7.5 mg tablet, Take 1 tablet (7.5 mg total) by mouth nightly., Disp: 90 tablet, Rfl: 3  •  therapeutic multivitamin tablet, Take 1 tablet by mouth daily., Disp: 90 tablet, Rfl: 3  •  thiamine 100 mg tablet, Take 1 tablet (100 mg total) by mouth daily., Disp: 90 tablet, Rfl: 3      BP Readings from Last 3 Encounters:   10/04/22 128/76   05/04/22 118/78   01/04/22 101/67       Recent Lab results:  Lab Results   Component Value Date    CHOL 182 09/26/2022   ,   Lab Results   Component Value Date    HDL 93 09/26/2022   ,   Lab Results   Component Value Date    LDLCALC 74 09/26/2022   ,   Lab Results   Component Value Date    TRIG 86 09/26/2022        Lab Results   Component Value Date    GLUCOSE 96 09/26/2022   ,   Lab Results   Component Value Date    HGBA1C 5.6 12/07/2020         Lab Results   Component Value Date    CREATININE 0.99 09/26/2022       Lab Results   Component Value Date    TSH 2.410 12/07/2020

## 2023-02-08 ENCOUNTER — OFFICE VISIT (OUTPATIENT)
Dept: INTERNAL MEDICINE | Facility: HOSPITAL | Age: 58
End: 2023-02-08
Attending: FAMILY MEDICINE
Payer: COMMERCIAL

## 2023-02-08 VITALS
DIASTOLIC BLOOD PRESSURE: 65 MMHG | OXYGEN SATURATION: 98 % | WEIGHT: 147.8 LBS | RESPIRATION RATE: 18 BRPM | HEART RATE: 80 BPM | BODY MASS INDEX: 23.2 KG/M2 | SYSTOLIC BLOOD PRESSURE: 117 MMHG | TEMPERATURE: 96.8 F | HEIGHT: 67 IN

## 2023-02-08 DIAGNOSIS — Z23 NEED FOR PNEUMOCOCCAL VACCINATION: ICD-10-CM

## 2023-02-08 DIAGNOSIS — Z01.84 IMMUNITY STATUS TESTING: ICD-10-CM

## 2023-02-08 DIAGNOSIS — F10.29 ALCOHOL DEPENDENCE WITH UNSPECIFIED ALCOHOL-INDUCED DISORDER: ICD-10-CM

## 2023-02-08 DIAGNOSIS — D70.8 OTHER NEUTROPENIA (CMS/HCC): ICD-10-CM

## 2023-02-08 DIAGNOSIS — E53.8 B12 DEFICIENCY: Primary | ICD-10-CM

## 2023-02-08 DIAGNOSIS — R63.4 WEIGHT LOSS: ICD-10-CM

## 2023-02-08 PROCEDURE — 3008F BODY MASS INDEX DOCD: CPT | Performed by: FAMILY MEDICINE

## 2023-02-08 PROCEDURE — 90677 PCV20 VACCINE IM: CPT | Performed by: FAMILY MEDICINE

## 2023-02-08 PROCEDURE — 3E0234Z INTRODUCTION OF SERUM, TOXOID AND VACCINE INTO MUSCLE, PERCUTANEOUS APPROACH: ICD-10-PCS | Performed by: FAMILY MEDICINE

## 2023-02-08 PROCEDURE — G0463 HOSPITAL OUTPT CLINIC VISIT: HCPCS | Mod: 25 | Performed by: FAMILY MEDICINE

## 2023-02-08 PROCEDURE — 63600000 HC DRUGS/DETAIL CODE: Performed by: FAMILY MEDICINE

## 2023-02-08 PROCEDURE — 96372 THER/PROPH/DIAG INJ SC/IM: CPT | Performed by: FAMILY MEDICINE

## 2023-02-08 PROCEDURE — 90471 IMMUNIZATION ADMIN: CPT | Performed by: FAMILY MEDICINE

## 2023-02-08 PROCEDURE — 99214 OFFICE O/P EST MOD 30 MIN: CPT | Mod: 25 | Performed by: FAMILY MEDICINE

## 2023-02-08 RX ORDER — CYANOCOBALAMIN 1000 UG/ML
1000 INJECTION, SOLUTION INTRAMUSCULAR; SUBCUTANEOUS ONCE
Status: COMPLETED | OUTPATIENT
Start: 2023-02-08 | End: 2023-02-08

## 2023-02-08 RX ADMIN — CYANOCOBALAMIN 1000 MCG: 1000 INJECTION, SOLUTION INTRAMUSCULAR; SUBCUTANEOUS at 13:41

## 2023-02-08 ASSESSMENT — PAIN SCALES - GENERAL: PAINLEVEL: 8

## 2023-02-08 NOTE — ASSESSMENT & PLAN NOTE
He is not taking his vitamins, I asked him again to take them and he says he will  Given he hasn't been taking them I gave him a b12 injection in the office  Check labs

## 2023-02-08 NOTE — PROGRESS NOTES
SUBJECTIVE    Rickie Kaminski is a 57 y.o. male who  has a past medical history of Alcoholism (CMS/HCC), Epilepsy (CMS/HCC), and Memory loss. who presents for routine follow up    Comes in as usual with his wife, Ina, his primary caregiver     He is having some pain in his right great toe, thinks he has and ingrown toenail     Admits he hasn't been taking his vitamins as directed     Otherwise he tells me he is doing well     His wife is worried about his weight  He says he is eating three meals a day but pt's wife says he is not  He does not eat junk food and prefers to eat a balanced meal containing a protein, starch and a vegetable  He denies night sweats, abdominal pain, change in bowel movements, shortness of breath, chest pain    He has cognitive decline and memory loss related to alcohol use and is not interested in stopping alcohol use    Agrees to flu shot today    UTD covid vaccines, shingrix  UTD colon cancer screening    Agrees to B12 injection and prevnar 20 today      Wt Readings from Last 15 Encounters:   02/08/23 67 kg (147 lb 12.8 oz)   10/04/22 67.8 kg (149 lb 6.4 oz)   05/04/22 70.9 kg (156 lb 3.2 oz)   01/04/22 65.8 kg (145 lb)   10/01/21 73.9 kg (163 lb)   07/19/21 79.4 kg (175 lb)   06/30/21 73.5 kg (162 lb)   05/20/21 71.7 kg (158 lb)   04/03/21 76.2 kg (168 lb)   03/18/21 76.2 kg (168 lb)   10/20/20 76.2 kg (168 lb)   01/17/20 72.6 kg (160 lb)   08/15/18 77.1 kg (170 lb)   07/10/18 78.5 kg (173 lb)     Past Medical History:   Diagnosis Date   • Alcoholism (CMS/HCC)    • Epilepsy (CMS/HCC)    • Memory loss      Social History     Tobacco Use   • Smoking status: Never     Passive exposure: Never   • Smokeless tobacco: Never   Vaping Use   • Vaping Use: Never used   Substance Use Topics   • Alcohol use: No   • Drug use: Yes     Types: Marijuana     Current Medications:   Current Outpatient Medications:   •  cyanocobalamin 1,000 mcg tablet, Take 1 tablet (1,000 mcg total) by mouth daily.,  "Disp: 90 tablet, Rfl: 3  •  folic acid 1 mg tablet, Take 1 tablet (1 mg total) by mouth daily., Disp: 90 tablet, Rfl: 3  •  mirtazapine (REMERON) 7.5 mg tablet, Take 1 tablet (7.5 mg total) by mouth nightly., Disp: 90 tablet, Rfl: 3  •  therapeutic multivitamin tablet, Take 1 tablet by mouth daily., Disp: 90 tablet, Rfl: 3  •  thiamine 100 mg tablet, Take 1 tablet (100 mg total) by mouth daily., Disp: 90 tablet, Rfl: 3    Allergies: Crab, Shellfish containing products, and Shellfish derived    Objective   Physical Exam:  Visit Vitals  /65   Pulse 80   Temp (!) 36 °C (96.8 °F) (Temporal)   Resp 18   Ht 1.702 m (5' 7\")   Wt 67 kg (147 lb 12.8 oz)   SpO2 98%   BMI 23.15 kg/m²      Physical Exam  Vitals reviewed.   Constitutional:       Appearance: Normal appearance.   Eyes:      General: No scleral icterus.  Cardiovascular:      Rate and Rhythm: Normal rate and regular rhythm.      Heart sounds: No murmur heard.  Pulmonary:      Effort: Pulmonary effort is normal.      Breath sounds: Normal breath sounds.   Abdominal:      Palpations: Abdomen is soft. There is no mass.      Tenderness: There is no abdominal tenderness. There is no guarding.      Comments: Obese abdomen   Musculoskeletal:         General: No swelling.   Lymphadenopathy:      Cervical: No cervical adenopathy.   Skin:     General: Skin is warm and dry.   Neurological:      Mental Status: He is alert and oriented to person, place, and time.   Psychiatric:         Mood and Affect: Mood normal.         Thought Content: Thought content normal.         Assessment & Plan:  Problem List Items Addressed This Visit        Unprioritized    Alcohol dependence (CMS/HCC)     Not willing to cut back  Drinking 3 large beers nightly         Relevant Orders    Comprehensive metabolic panel    Protime-INR    B12 deficiency - Primary     He is not taking his vitamins, I asked him again to take them and he says he will  Given he hasn't been taking them I gave him a b12 " injection in the office  Check labs         Relevant Orders    CBC and differential    Vitamin B12    Methylmalonic Acid, Serum    Weight loss     No systemic symptoms, normal vital signs and exam  UTD colorectal cancer screening  Suspect in the setting of heavy alcohol use and decreased caloric intake  Encouraged him to increase calories and try to ensure nutritious foods  Check labs including TSH         Relevant Orders    TSH    Other neutropenia (CMS/HCC)     Isolate neutropenia on last labs with leukopenia  Recheck cbc  Encourage vitamins- folate, thiamine, b12 every day  Gave b12 injection today           Relevant Orders    CBC and differential    Vitamin B12    Methylmalonic Acid, Serum   Other Visit Diagnoses     Immunity status testing        Relevant Orders    Hepatitis B surface antibody    Need for pneumococcal vaccination        Relevant Orders    Pneumococcal conjugate vaccine 20-valent IM (Completed)          Return in about 4 months (around 6/8/2023).  Rebecca Blakely MD

## 2023-02-08 NOTE — ASSESSMENT & PLAN NOTE
Isolate neutropenia on last labs with leukopenia  Recheck cbc  Encourage vitamins- folate, thiamine, b12 every day  Gave b12 injection today

## 2023-02-08 NOTE — ASSESSMENT & PLAN NOTE
No systemic symptoms, normal vital signs and exam  UTD colorectal cancer screening  Suspect in the setting of heavy alcohol use and decreased caloric intake  Encouraged him to increase calories and try to ensure nutritious foods  Check labs including TSH

## 2023-02-25 LAB
ALBUMIN SERPL-MCNC: 4 G/DL (ref 3.8–4.9)
ALBUMIN/GLOB SERPL: 1 {RATIO} (ref 1.2–2.2)
ALP SERPL-CCNC: 81 IU/L (ref 44–121)
ALT SERPL-CCNC: 8 IU/L (ref 0–44)
AST SERPL-CCNC: 21 IU/L (ref 0–40)
BASOPHILS # BLD AUTO: 0 X10E3/UL (ref 0–0.2)
BASOPHILS NFR BLD AUTO: 1 %
BILIRUB SERPL-MCNC: 0.9 MG/DL (ref 0–1.2)
BUN SERPL-MCNC: 11 MG/DL (ref 6–24)
BUN/CREAT SERPL: 13 (ref 9–20)
CALCIUM SERPL-MCNC: 8.9 MG/DL (ref 8.7–10.2)
CHLORIDE SERPL-SCNC: 100 MMOL/L (ref 96–106)
CO2 SERPL-SCNC: 20 MMOL/L (ref 20–29)
CREAT SERPL-MCNC: 0.85 MG/DL (ref 0.76–1.27)
EGFRCR SERPLBLD CKD-EPI 2021: 100 ML/MIN/1.73
EOSINOPHIL # BLD AUTO: 0.1 X10E3/UL (ref 0–0.4)
EOSINOPHIL NFR BLD AUTO: 3 %
ERYTHROCYTE [DISTWIDTH] IN BLOOD BY AUTOMATED COUNT: 17.2 % (ref 11.6–15.4)
GLOBULIN SER CALC-MCNC: 4.2 G/DL (ref 1.5–4.5)
GLUCOSE SERPL-MCNC: 94 MG/DL (ref 70–99)
HBV SURFACE AB SER QL: NON REACTIVE
HCT VFR BLD AUTO: 50.1 % (ref 37.5–51)
HGB BLD-MCNC: 15.7 G/DL (ref 13–17.7)
IMM GRANULOCYTES # BLD AUTO: 0 X10E3/UL (ref 0–0.1)
IMM GRANULOCYTES NFR BLD AUTO: 0 %
INR PPP: 1 (ref 0.9–1.2)
LYMPHOCYTES # BLD AUTO: 1.6 X10E3/UL (ref 0.7–3.1)
LYMPHOCYTES NFR BLD AUTO: 46 %
MCH RBC QN AUTO: 22.4 PG (ref 26.6–33)
MCHC RBC AUTO-ENTMCNC: 31.3 G/DL (ref 31.5–35.7)
MCV RBC AUTO: 71 FL (ref 79–97)
MONOCYTES # BLD AUTO: 0.3 X10E3/UL (ref 0.1–0.9)
MONOCYTES NFR BLD AUTO: 8 %
NEUTROPHILS # BLD AUTO: 1.5 X10E3/UL (ref 1.4–7)
NEUTROPHILS NFR BLD AUTO: 42 %
PLATELET # BLD AUTO: 330 X10E3/UL (ref 150–450)
POTASSIUM SERPL-SCNC: 3.9 MMOL/L (ref 3.5–5.2)
PROT SERPL-MCNC: 8.2 G/DL (ref 6–8.5)
PROTHROMBIN TIME: 10.8 SEC (ref 9.1–12)
RBC # BLD AUTO: 7.02 X10E6/UL (ref 4.14–5.8)
SODIUM SERPL-SCNC: 140 MMOL/L (ref 134–144)
TSH SERPL DL<=0.005 MIU/L-ACNC: 2.55 UIU/ML (ref 0.45–4.5)
VIT B12 SERPL-MCNC: 358 PG/ML (ref 232–1245)
WBC # BLD AUTO: 3.6 X10E3/UL (ref 3.4–10.8)

## 2023-02-27 ENCOUNTER — TELEPHONE (OUTPATIENT)
Dept: INTERNAL MEDICINE | Facility: HOSPITAL | Age: 58
End: 2023-02-27
Payer: COMMERCIAL

## 2023-03-09 LAB — METHYLMALONATE SERPL-SCNC: 110 NMOL/L (ref 0–378)

## 2023-06-13 ENCOUNTER — OFFICE VISIT (OUTPATIENT)
Dept: INTERNAL MEDICINE | Facility: HOSPITAL | Age: 58
End: 2023-06-13
Payer: COMMERCIAL

## 2023-06-13 VITALS
DIASTOLIC BLOOD PRESSURE: 74 MMHG | TEMPERATURE: 97 F | HEART RATE: 85 BPM | BODY MASS INDEX: 21.5 KG/M2 | OXYGEN SATURATION: 97 % | HEIGHT: 67 IN | SYSTOLIC BLOOD PRESSURE: 118 MMHG | WEIGHT: 137 LBS

## 2023-06-13 DIAGNOSIS — R63.4 WEIGHT LOSS: Primary | ICD-10-CM

## 2023-06-13 DIAGNOSIS — E53.8 B12 DEFICIENCY: ICD-10-CM

## 2023-06-13 DIAGNOSIS — Z23 NEED FOR IMMUNIZATION AGAINST VIRAL HEPATITIS: ICD-10-CM

## 2023-06-13 DIAGNOSIS — F10.29 ALCOHOL DEPENDENCE WITH UNSPECIFIED ALCOHOL-INDUCED DISORDER: ICD-10-CM

## 2023-06-13 DIAGNOSIS — F10.10 ALCOHOL ABUSE: ICD-10-CM

## 2023-06-13 PROBLEM — M54.50 CHRONIC LOW BACK PAIN: Status: RESOLVED | Noted: 2020-10-20 | Resolved: 2023-06-13

## 2023-06-13 PROBLEM — M54.2 CERVICALGIA: Status: RESOLVED | Noted: 2022-10-04 | Resolved: 2023-06-13

## 2023-06-13 PROBLEM — G89.29 CHRONIC LOW BACK PAIN: Status: RESOLVED | Noted: 2020-10-20 | Resolved: 2023-06-13

## 2023-06-13 PROCEDURE — 99214 OFFICE O/P EST MOD 30 MIN: CPT | Mod: 25 | Performed by: FAMILY MEDICINE

## 2023-06-13 PROCEDURE — 90471 IMMUNIZATION ADMIN: CPT | Performed by: FAMILY MEDICINE

## 2023-06-13 PROCEDURE — 3E0236Z INTRODUCTION OF NUTRITIONAL SUBSTANCE INTO MUSCLE, PERCUTANEOUS APPROACH: ICD-10-PCS | Performed by: FAMILY MEDICINE

## 2023-06-13 PROCEDURE — G0463 HOSPITAL OUTPT CLINIC VISIT: HCPCS | Mod: 25 | Performed by: FAMILY MEDICINE

## 2023-06-13 PROCEDURE — 96372 THER/PROPH/DIAG INJ SC/IM: CPT | Performed by: FAMILY MEDICINE

## 2023-06-13 PROCEDURE — 63600000 HC DRUGS/DETAIL CODE: Performed by: FAMILY MEDICINE

## 2023-06-13 PROCEDURE — 3008F BODY MASS INDEX DOCD: CPT | Performed by: FAMILY MEDICINE

## 2023-06-13 PROCEDURE — 3E0234Z INTRODUCTION OF SERUM, TOXOID AND VACCINE INTO MUSCLE, PERCUTANEOUS APPROACH: ICD-10-PCS | Performed by: FAMILY MEDICINE

## 2023-06-13 RX ORDER — LANOLIN ALCOHOL/MO/W.PET/CERES
1000 CREAM (GRAM) TOPICAL DAILY
Qty: 90 TABLET | Refills: 3 | Status: SHIPPED | OUTPATIENT
Start: 2023-06-13 | End: 2024-04-17 | Stop reason: SDUPTHER

## 2023-06-13 RX ORDER — CYANOCOBALAMIN 1000 UG/ML
1000 INJECTION, SOLUTION INTRAMUSCULAR; SUBCUTANEOUS ONCE
Status: COMPLETED | OUTPATIENT
Start: 2023-06-13 | End: 2023-06-13

## 2023-06-13 RX ORDER — DORZOLAMIDE HYDROCHLORIDE AND TIMOLOL MALEATE 20; 5 MG/ML; MG/ML
SOLUTION/ DROPS OPHTHALMIC
COMMUNITY
Start: 2023-03-27

## 2023-06-13 RX ORDER — LANOLIN ALCOHOL/MO/W.PET/CERES
100 CREAM (GRAM) TOPICAL DAILY
Qty: 90 TABLET | Refills: 3 | Status: SHIPPED | OUTPATIENT
Start: 2023-06-13 | End: 2024-04-17 | Stop reason: SDUPTHER

## 2023-06-13 RX ORDER — FOLIC ACID 1 MG/1
1 TABLET ORAL DAILY
Qty: 90 TABLET | Refills: 3 | Status: SHIPPED | OUTPATIENT
Start: 2023-06-13 | End: 2024-04-17 | Stop reason: SDUPTHER

## 2023-06-13 RX ADMIN — CYANOCOBALAMIN 1000 MCG: 1000 INJECTION, SOLUTION INTRAMUSCULAR; SUBCUTANEOUS at 12:36

## 2023-06-13 ASSESSMENT — PAIN SCALES - GENERAL: PAINLEVEL: 0-NO PAIN

## 2023-06-13 NOTE — PROGRESS NOTES
"SUBJECTIVE    Rickie Kaminski is a 57 y.o. male who  has a past medical history of Alcoholism (CMS/HCC), Epilepsy (CMS/HCC), and Memory loss. who presents for routine follow up    Usually he comes with Ina, his wife and primary caregiver, today however he comes by himself     He tells me he is feeling well and he offers no concerns today     He tells me he is taking his vitamins sometimes, mostly when his wife reminds him      His wife saw me in the office yesterday and let me know she is worried about his weight  He has lost another 10 pounds from his last visit  He says he has been eating less junk food and walking more which is why he accounts for weight loss  He denies night sweats, abdominal pain, change in bowel movements, shortness of breath, chest pain or any other systemic symptoms  He has never smoked cigarettes    He continues to drink \"a couple of 40s of malt liquor\" a day starting at about noon    He has cognitive decline and memory loss related to alcohol use and is not interested in stopping alcohol use    He has had b12 deficiency, he agrees to another b12 injection today    Last labs show he is hepatitis b non-immune  He agrees to hepatitis b vaccination today    UTD covid vaccines, shingrix, pneumococcal vaccination  UTD colon cancer screening      Wt Readings from Last 15 Encounters:   06/13/23 62.1 kg (137 lb)   02/08/23 67 kg (147 lb 12.8 oz)   10/04/22 67.8 kg (149 lb 6.4 oz)   05/04/22 70.9 kg (156 lb 3.2 oz)   01/04/22 65.8 kg (145 lb)   10/01/21 73.9 kg (163 lb)   07/19/21 79.4 kg (175 lb)   06/30/21 73.5 kg (162 lb)   05/20/21 71.7 kg (158 lb)   04/03/21 76.2 kg (168 lb)   03/18/21 76.2 kg (168 lb)   10/20/20 76.2 kg (168 lb)   01/17/20 72.6 kg (160 lb)   08/15/18 77.1 kg (170 lb)   07/10/18 78.5 kg (173 lb)     Past Medical History:   Diagnosis Date   • Alcoholism (CMS/HCC)    • Epilepsy (CMS/HCC)    • Memory loss      Social History     Tobacco Use   • Smoking status: Never     Passive " "exposure: Never   • Smokeless tobacco: Never   Vaping Use   • Vaping Use: Never used   Substance Use Topics   • Alcohol use: No   • Drug use: Yes     Types: Marijuana     Current Medications:   Current Outpatient Medications:   •  cyanocobalamin (VITAMIN B12) 1,000 mcg tablet, Take 1 tablet (1,000 mcg total) by mouth daily., Disp: 90 tablet, Rfl: 3  •  dorzolamide-timoloL (COSOPT) 22.3-6.8 mg/mL ophthalmic solution, instill 1 drop in both eyes 2x daily, Disp: , Rfl:   •  folic acid (FOLVITE) 1 mg tablet, Take 1 tablet (1 mg total) by mouth daily., Disp: 90 tablet, Rfl: 3  •  mirtazapine (REMERON) 7.5 mg tablet, Take 1 tablet (7.5 mg total) by mouth nightly., Disp: 90 tablet, Rfl: 3  •  therapeutic multivitamin (THERAGRAN) tablet, Take 1 tablet by mouth daily., Disp: 90 tablet, Rfl: 3  •  thiamine 100 mg tablet, Take 1 tablet (100 mg total) by mouth daily., Disp: 90 tablet, Rfl: 3    Allergies: Crab, Shellfish containing products, and Shellfish derived    Objective   Physical Exam:  Visit Vitals  /74   Pulse 85   Temp 36.1 °C (97 °F) (Temporal)   Ht 1.702 m (5' 7\")   Wt 62.1 kg (137 lb)   SpO2 97%   BMI 21.46 kg/m²      Physical Exam  Vitals reviewed.   HENT:      Right Ear: Tympanic membrane normal.      Left Ear: Tympanic membrane normal.      Mouth/Throat:      Mouth: Mucous membranes are moist.      Pharynx: Oropharynx is clear.   Eyes:      General: No scleral icterus.  Cardiovascular:      Rate and Rhythm: Normal rate and regular rhythm.   Pulmonary:      Effort: Pulmonary effort is normal.      Breath sounds: Normal breath sounds.   Abdominal:      General: There is no distension.      Palpations: Abdomen is soft.      Tenderness: There is no abdominal tenderness.   Musculoskeletal:         General: No swelling.   Skin:     General: Skin is warm and dry.   Neurological:      Mental Status: He is alert and oriented to person, place, and time.         Lab Results   Component Value Date    CHOL 182 " 09/26/2022    CHOL 196 12/07/2020    CHOL 196 08/12/2017     Lab Results   Component Value Date    HDL 93 09/26/2022    HDL 89 12/07/2020    HDL 87 08/12/2017     Lab Results   Component Value Date    LDLCALC 74 09/26/2022    LDLCALC 91 12/07/2020    LDLCALC 85 08/12/2017     Lab Results   Component Value Date    TRIG 86 09/26/2022    TRIG 93 12/07/2020    TRIG 122 08/12/2017     The 10-year ASCVD risk score (Vicki GUAMAN, et al., 2019) is: 9.1%    Values used to calculate the score:      Age: 57 years      Sex: Male      Is Non- : Yes      Diabetic: No      Tobacco smoker: No      Systolic Blood Pressure: 118 mmHg      Is BP treated: Yes      HDL Cholesterol: 93 mg/dL      Total Cholesterol: 182 mg/dL    Assessment & Plan:  Problem List Items Addressed This Visit        Unprioritized    Alcohol dependence (CMS/HCC)     Long history of alcohol use disorder  He continues to declines cessation or cutting back alcohol use  Check labs  He has fatty liver but no cirrhosis  I asked him to continue folate and thiamine supplements         Relevant Medications    therapeutic multivitamin (THERAGRAN) tablet    thiamine 100 mg tablet    folic acid (FOLVITE) 1 mg tablet    Other Relevant Orders    CBC and differential    Comprehensive metabolic panel    B12 deficiency     Continue oral b12  Gave b12 injection today         Relevant Medications    cyanocobalamin (VITAMIN B12) 1,000 mcg tablet    Weight loss - Primary     He has no systemic complaints or concerns and a normal physical exam  He attributes weight loss to improved diet and increased exercise  TSH checked for this issue normal 4 months ago  If ongoing may need to check a ct scan C/A/P but I first asked him to increase calories to prevent further weight loss  Most likely this is related to alcohol abuse         Relevant Orders    CBC and differential   Other Visit Diagnoses     Need for immunization against viral hepatitis        Relevant Orders     Hepatitis B vaccine (Heplisav) adult IM (Completed)    Alcohol abuse        Relevant Medications    therapeutic multivitamin (THERAGRAN) tablet    thiamine 100 mg tablet    folic acid (FOLVITE) 1 mg tablet          Return in about 4 months (around 10/13/2023).  Rebecca Blakely MD

## 2023-06-13 NOTE — PATIENT INSTRUCTIONS
Today I gave you a hepatitis B vaccine and a vitamin b12 shot    You will need a second and final hepatitis B vaccine at your next visit    Please continue taking your vitamins at home, I refilled them for you    Get repeat blood work for me when you can    Try to get enough nutrition so you don't lose any more weight

## 2023-06-13 NOTE — ASSESSMENT & PLAN NOTE
Long history of alcohol use disorder  He continues to declines cessation or cutting back alcohol use  Check labs  He has fatty liver but no cirrhosis  I asked him to continue folate and thiamine supplements

## 2023-06-13 NOTE — ASSESSMENT & PLAN NOTE
He has no systemic complaints or concerns and a normal physical exam  He attributes weight loss to improved diet and increased exercise  TSH checked for this issue normal 4 months ago  If ongoing may need to check a ct scan C/A/P but I first asked him to increase calories to prevent further weight loss  Most likely this is related to alcohol abuse

## 2023-06-23 LAB
ALBUMIN SERPL-MCNC: 3.9 G/DL (ref 3.8–4.9)
ALBUMIN/GLOB SERPL: 1.1 {RATIO} (ref 1.2–2.2)
ALP SERPL-CCNC: 116 IU/L (ref 44–121)
ALT SERPL-CCNC: 19 IU/L (ref 0–44)
AST SERPL-CCNC: 39 IU/L (ref 0–40)
BASOPHILS # BLD AUTO: 0 X10E3/UL (ref 0–0.2)
BASOPHILS NFR BLD AUTO: 0 %
BILIRUB SERPL-MCNC: 0.9 MG/DL (ref 0–1.2)
BUN SERPL-MCNC: 9 MG/DL (ref 6–24)
BUN/CREAT SERPL: 11 (ref 9–20)
CALCIUM SERPL-MCNC: 8.7 MG/DL (ref 8.7–10.2)
CHLORIDE SERPL-SCNC: 98 MMOL/L (ref 96–106)
CO2 SERPL-SCNC: 23 MMOL/L (ref 20–29)
CREAT SERPL-MCNC: 0.82 MG/DL (ref 0.76–1.27)
EGFRCR SERPLBLD CKD-EPI 2021: 101 ML/MIN/1.73
EOSINOPHIL # BLD AUTO: 0 X10E3/UL (ref 0–0.4)
EOSINOPHIL NFR BLD AUTO: 1 %
ERYTHROCYTE [DISTWIDTH] IN BLOOD BY AUTOMATED COUNT: 20 % (ref 11.6–15.4)
GLOBULIN SER CALC-MCNC: 3.6 G/DL (ref 1.5–4.5)
GLUCOSE SERPL-MCNC: 84 MG/DL (ref 70–99)
HCT VFR BLD AUTO: 44.2 % (ref 37.5–51)
HGB BLD-MCNC: 14.7 G/DL (ref 13–17.7)
IMM GRANULOCYTES # BLD AUTO: 0 X10E3/UL (ref 0–0.1)
IMM GRANULOCYTES NFR BLD AUTO: 0 %
LYMPHOCYTES # BLD AUTO: 1.4 X10E3/UL (ref 0.7–3.1)
LYMPHOCYTES NFR BLD AUTO: 38 %
MCH RBC QN AUTO: 25.7 PG (ref 26.6–33)
MCHC RBC AUTO-ENTMCNC: 33.3 G/DL (ref 31.5–35.7)
MCV RBC AUTO: 77 FL (ref 79–97)
MONOCYTES # BLD AUTO: 0.4 X10E3/UL (ref 0.1–0.9)
MONOCYTES NFR BLD AUTO: 11 %
NEUTROPHILS # BLD AUTO: 1.8 X10E3/UL (ref 1.4–7)
NEUTROPHILS NFR BLD AUTO: 50 %
PLATELET # BLD AUTO: 223 X10E3/UL (ref 150–450)
POTASSIUM SERPL-SCNC: 3.8 MMOL/L (ref 3.5–5.2)
PROT SERPL-MCNC: 7.5 G/DL (ref 6–8.5)
RBC # BLD AUTO: 5.73 X10E6/UL (ref 4.14–5.8)
SODIUM SERPL-SCNC: 138 MMOL/L (ref 134–144)
WBC # BLD AUTO: 3.6 X10E3/UL (ref 3.4–10.8)

## 2023-10-20 ENCOUNTER — HOSPITAL ENCOUNTER (OUTPATIENT)
Dept: RADIOLOGY | Facility: HOSPITAL | Age: 58
Discharge: HOME | End: 2023-10-20
Attending: FAMILY MEDICINE
Payer: COMMERCIAL

## 2023-10-20 ENCOUNTER — OFFICE VISIT (OUTPATIENT)
Dept: INTERNAL MEDICINE | Facility: HOSPITAL | Age: 58
End: 2023-10-20
Payer: COMMERCIAL

## 2023-10-20 ENCOUNTER — PATIENT OUTREACH (OUTPATIENT)
Dept: RADIOLOGY | Facility: HOSPITAL | Age: 58
End: 2023-10-20
Payer: COMMERCIAL

## 2023-10-20 VITALS
SYSTOLIC BLOOD PRESSURE: 119 MMHG | RESPIRATION RATE: 18 BRPM | HEIGHT: 67 IN | WEIGHT: 132.8 LBS | OXYGEN SATURATION: 99 % | HEART RATE: 83 BPM | DIASTOLIC BLOOD PRESSURE: 77 MMHG | BODY MASS INDEX: 20.84 KG/M2

## 2023-10-20 DIAGNOSIS — Z23 NEED FOR INFLUENZA VACCINATION: Primary | ICD-10-CM

## 2023-10-20 DIAGNOSIS — N63.21 MASS OF UPPER OUTER QUADRANT OF LEFT BREAST: ICD-10-CM

## 2023-10-20 PROCEDURE — 76642 ULTRASOUND BREAST LIMITED: CPT | Mod: LT

## 2023-10-20 PROCEDURE — 3008F BODY MASS INDEX DOCD: CPT | Performed by: FAMILY MEDICINE

## 2023-10-20 PROCEDURE — 77066 DX MAMMO INCL CAD BI: CPT

## 2023-10-20 PROCEDURE — 99214 OFFICE O/P EST MOD 30 MIN: CPT | Mod: 25 | Performed by: FAMILY MEDICINE

## 2023-10-20 PROCEDURE — 90471 IMMUNIZATION ADMIN: CPT | Performed by: FAMILY MEDICINE

## 2023-10-20 ASSESSMENT — PAIN SCALES - GENERAL: PAINLEVEL: 0-NO PAIN

## 2023-10-20 NOTE — PROGRESS NOTES
SUBJECTIVE    Rickie Kaminski is a 58 y.o. male who  has a past medical history of Alcoholism (CMS/HCC), Epilepsy (CMS/HCC), and Memory loss. who presents for routine follow up    He comes today with Ina, his wife and primary caregiver     His wife tells me she noticed he has a hard mass above his left nipple  She tells me its been there for at least 3 months  The skin is darker over the mass  Rickie tells me that the area is not painful  He is not sure how long its been there    There is no known family history of breast cancer    Agrees to flu shot today    Wt Readings from Last 15 Encounters:   10/20/23 60.2 kg (132 lb 12.8 oz)   06/13/23 62.1 kg (137 lb)   02/08/23 67 kg (147 lb 12.8 oz)   10/04/22 67.8 kg (149 lb 6.4 oz)   05/04/22 70.9 kg (156 lb 3.2 oz)   01/04/22 65.8 kg (145 lb)   10/01/21 73.9 kg (163 lb)   07/19/21 79.4 kg (175 lb)   06/30/21 73.5 kg (162 lb)   05/20/21 71.7 kg (158 lb)   04/03/21 76.2 kg (168 lb)   03/18/21 76.2 kg (168 lb)   10/20/20 76.2 kg (168 lb)   01/17/20 72.6 kg (160 lb)   08/15/18 77.1 kg (170 lb)     Past Medical History:   Diagnosis Date    Alcoholism (CMS/HCC)     Epilepsy (CMS/HCC)     Memory loss      Social History     Tobacco Use    Smoking status: Never     Passive exposure: Never    Smokeless tobacco: Never   Vaping Use    Vaping Use: Never used   Substance Use Topics    Alcohol use: No    Drug use: Yes     Types: Marijuana     Current Medications:   Current Outpatient Medications:     cyanocobalamin (VITAMIN B12) 1,000 mcg tablet, Take 1 tablet (1,000 mcg total) by mouth daily., Disp: 90 tablet, Rfl: 3    dorzolamide-timoloL (COSOPT) 22.3-6.8 mg/mL ophthalmic solution, instill 1 drop in both eyes 2x daily, Disp: , Rfl:     folic acid (FOLVITE) 1 mg tablet, Take 1 tablet (1 mg total) by mouth daily., Disp: 90 tablet, Rfl: 3    mirtazapine (REMERON) 7.5 mg tablet, Take 1 tablet (7.5 mg total) by mouth nightly., Disp: 90 tablet, Rfl: 3    therapeutic  "multivitamin (THERAGRAN) tablet, Take 1 tablet by mouth daily., Disp: 90 tablet, Rfl: 3    thiamine 100 mg tablet, Take 1 tablet (100 mg total) by mouth daily., Disp: 90 tablet, Rfl: 3    Allergies: Shellfish containing products, Crab, and Shellfish derived    Objective   Physical Exam:  Visit Vitals  /77 (BP Location: Left upper arm, Patient Position: Sitting)   Pulse 83   Resp 18   Ht 1.702 m (5' 7\")   Wt 60.2 kg (132 lb 12.8 oz)   SpO2 99%   BMI 20.80 kg/m²      Physical Exam  Vitals reviewed.   Constitutional:       Appearance: Normal appearance.   Cardiovascular:      Rate and Rhythm: Normal rate.   Pulmonary:      Effort: Pulmonary effort is normal.   Chest:      Comments: There is a 3-1/2 to 4 cm in diameter very firm nonmobile mass at 1:00 approximately 1 to 2 cm from the nipple on the left breast  No appreciated adenopathy in the bilateral axillae  No appreciated masses in the right breast  There is a firm subcutaneous mass on the posterior left arm  Neurological:      Mental Status: He is alert and oriented to person, place, and time.   Psychiatric:         Mood and Affect: Mood normal.         Behavior: Behavior normal.         Thought Content: Thought content normal.         Judgment: Judgment normal.         Lab Results   Component Value Date    CHOL 182 09/26/2022    CHOL 196 12/07/2020    CHOL 196 08/12/2017     Lab Results   Component Value Date    HDL 93 09/26/2022    HDL 89 12/07/2020    HDL 87 08/12/2017     Lab Results   Component Value Date    LDLCALC 74 09/26/2022    LDLCALC 91 12/07/2020    LDLCALC 85 08/12/2017     Lab Results   Component Value Date    TRIG 86 09/26/2022    TRIG 93 12/07/2020    TRIG 122 08/12/2017     The 10-year ASCVD risk score (Vicki GUAMAN, et al., 2019) is: 9.6%    Values used to calculate the score:      Age: 58 years      Sex: Male      Is Non- : Yes      Diabetic: No      Tobacco smoker: No      Systolic Blood Pressure: 119 mmHg      Is BP " treated: Yes      HDL Cholesterol: 93 mg/dL      Total Cholesterol: 182 mg/dL    Assessment & Plan:  Problem List Items Addressed This Visit        Unprioritized    Mass of upper outer quadrant of left breast     Exam concerning for breast cancer with very hard nonmobile, approximately 4 cm mass in the left breast  I called the breast center, they will bring him up for a diagnostic mammogram and ultrasound now, this afternoon  Regardless of imaging results this lesion will need biopsy         Relevant Orders    BI DIAGNOSTIC MAMMOGRAM BILATERAL (TOMOSYNTHESIS)    Ambulatory referral to Breast Surgery    ULTRASOUND BREAST LIMITED LEFT   Other Visit Diagnoses     Need for influenza vaccination    -  Primary    Relevant Orders    Influenza vaccine quadrivalent preservative free 6 mon and older IM ([facility] Only) (Completed)          Return in about 3 months (around 1/20/2024).  Rebecca Blakely MD

## 2023-10-20 NOTE — ASSESSMENT & PLAN NOTE
Exam concerning for breast cancer with very hard nonmobile, approximately 4 cm mass in the left breast  I called the breast center, they will bring him up for a diagnostic mammogram and ultrasound now, this afternoon  Regardless of imaging results this lesion will need biopsy

## 2023-10-20 NOTE — PROGRESS NOTES
Met with Enr[facility] in the [facility], following imaging for which he was recommended u/s guided biopsy. He will follow up with an appointment with Dr. Kowalski 8/24.

## 2023-10-23 ENCOUNTER — TELEPHONE (OUTPATIENT)
Dept: INTERNAL MEDICINE | Facility: HOSPITAL | Age: 58
End: 2023-10-23
Payer: COMMERCIAL

## 2023-10-23 NOTE — TELEPHONE ENCOUNTER
Called and spoke with pt's wife about results. Biopsy is scheduled for tomorrow. I offered my ongoing support

## 2023-10-24 ENCOUNTER — OFFICE VISIT (OUTPATIENT)
Dept: SURGERY | Facility: CLINIC | Age: 58
End: 2023-10-24
Payer: COMMERCIAL

## 2023-10-24 VITALS
SYSTOLIC BLOOD PRESSURE: 101 MMHG | TEMPERATURE: 97.2 F | HEIGHT: 63 IN | BODY MASS INDEX: 23.04 KG/M2 | WEIGHT: 130 LBS | HEART RATE: 71 BPM | OXYGEN SATURATION: 97 % | DIASTOLIC BLOOD PRESSURE: 70 MMHG

## 2023-10-24 DIAGNOSIS — N63.20 MASS OF LEFT BREAST, UNSPECIFIED QUADRANT: Primary | ICD-10-CM

## 2023-10-24 DIAGNOSIS — R22.32 MASS OF LEFT UPPER EXTREMITY: ICD-10-CM

## 2023-10-24 PROCEDURE — 99204 OFFICE O/P NEW MOD 45 MIN: CPT | Performed by: SURGERY

## 2023-10-24 PROCEDURE — 3008F BODY MASS INDEX DOCD: CPT | Performed by: SURGERY

## 2023-10-24 ASSESSMENT — ENCOUNTER SYMPTOMS
HEMATOLOGIC/LYMPHATIC NEGATIVE: 1
NEUROLOGICAL NEGATIVE: 1
ALLERGIC/IMMUNOLOGIC NEGATIVE: 1
RESPIRATORY NEGATIVE: 1
CARDIOVASCULAR NEGATIVE: 1
GASTROINTESTINAL NEGATIVE: 1
CONSTITUTIONAL NEGATIVE: 1
MUSCULOSKELETAL NEGATIVE: 1
ENDOCRINE NEGATIVE: 1
PSYCHIATRIC NEGATIVE: 1

## 2023-10-27 LAB
CYTOLOGY TISS FNA DOC CYTO: NORMAL
DX ICD CODE: NORMAL
LAB CORP PERFORMED BY:: NORMAL
PATH REPORT.COMMENTS IMP SPEC: NORMAL
PATH REPORT.GROSS SPEC: NORMAL
PATH REPORT.SITE OF ORIGIN SPEC: NORMAL
PATHOLOGIST NAME: NORMAL

## 2023-11-01 LAB
DX ICD CODE: NORMAL
DX ICD CODE: NORMAL
PATH REPORT.FINAL DX SPEC: NORMAL
PATH REPORT.GROSS SPEC: NORMAL
PATH REPORT.SITE OF ORIGIN SPEC: NORMAL
PATHOLOGIST NAME: NORMAL
PAYMENT PROCEDURE: NORMAL

## 2023-11-02 ENCOUNTER — TELEPHONE (OUTPATIENT)
Dept: INTERNAL MEDICINE | Facility: HOSPITAL | Age: 58
End: 2023-11-02
Payer: COMMERCIAL

## 2023-11-02 NOTE — TELEPHONE ENCOUNTER
I called Ina, pt's wife, and discussed biopsy results.  It looks like the skin lesion is just fibrotic/ inflammatory. The breast mass biopsy looks insufficient and may need to be repeated but there are no malignant cells. They will see Dr Dex robles for follow up on 11/7

## 2023-11-07 ENCOUNTER — OFFICE VISIT (OUTPATIENT)
Dept: SURGERY | Facility: CLINIC | Age: 58
End: 2023-11-07
Payer: COMMERCIAL

## 2023-11-07 VITALS
HEIGHT: 66 IN | SYSTOLIC BLOOD PRESSURE: 127 MMHG | WEIGHT: 138.8 LBS | HEART RATE: 91 BPM | BODY MASS INDEX: 22.31 KG/M2 | DIASTOLIC BLOOD PRESSURE: 87 MMHG

## 2023-11-07 DIAGNOSIS — N63.20 MASS OF LEFT BREAST, UNSPECIFIED QUADRANT: Primary | ICD-10-CM

## 2023-11-07 PROCEDURE — 3008F BODY MASS INDEX DOCD: CPT | Performed by: SURGERY

## 2023-11-07 PROCEDURE — 99213 OFFICE O/P EST LOW 20 MIN: CPT | Performed by: SURGERY

## 2023-11-07 ASSESSMENT — PAIN SCALES - GENERAL: PAINLEVEL: 6

## 2023-11-07 ASSESSMENT — ENCOUNTER SYMPTOMS
CARDIOVASCULAR NEGATIVE: 1
MUSCULOSKELETAL NEGATIVE: 1
NEUROLOGICAL NEGATIVE: 1
CONSTITUTIONAL NEGATIVE: 1
ENDOCRINE NEGATIVE: 1
ALLERGIC/IMMUNOLOGIC NEGATIVE: 1
PSYCHIATRIC NEGATIVE: 1
HEMATOLOGIC/LYMPHATIC NEGATIVE: 1
GASTROINTESTINAL NEGATIVE: 1
RESPIRATORY NEGATIVE: 1

## 2023-11-07 NOTE — PROGRESS NOTES
Patient ID: Rickie Kaminski                              : 1965  MRN: [id number]                                            Visit Date: 2023  Encounter Provider: Van Kowalski  Referring Provider: No ref. provider found    Subjective   Chief Complaint: Follow-up (Chest concerns) and Suture / Staple Removal    Rickie Kaminski is a 58 y.o. old male presenting today for evaluation of 2 lesions.  1 in the breast along the left arm.  Biopsy both these and then showed an inflammatory infiltrate.  He is here for evaluation..      Medications:   Current Outpatient Medications:     dorzolamide-timoloL (COSOPT) 22.3-6.8 mg/mL ophthalmic solution, instill 1 drop in both eyes 2x daily, Disp: , Rfl:     folic acid (FOLVITE) 1 mg tablet, Take 1 tablet (1 mg total) by mouth daily., Disp: 90 tablet, Rfl: 3    cyanocobalamin (VITAMIN B12) 1,000 mcg tablet, Take 1 tablet (1,000 mcg total) by mouth daily. (Patient not taking: Reported on 10/24/2023), Disp: 90 tablet, Rfl: 3    mirtazapine (REMERON) 7.5 mg tablet, Take 1 tablet (7.5 mg total) by mouth nightly. (Patient not taking: Reported on 10/24/2023), Disp: 90 tablet, Rfl: 3    therapeutic multivitamin (THERAGRAN) tablet, Take 1 tablet by mouth daily. (Patient not taking: Reported on 10/24/2023), Disp: 90 tablet, Rfl: 3    thiamine 100 mg tablet, Take 1 tablet (100 mg total) by mouth daily. (Patient not taking: Reported on 10/24/2023), Disp: 90 tablet, Rfl: 3    Past Medical History:  has a past medical history of Alcoholism (CMS/HCC), Epilepsy (CMS/HCC), and Memory loss.  Past Surgical History:  has no past surgical history on file.  Social History:   Social History     Tobacco Use    Smoking status: Never     Passive exposure: Never    Smokeless tobacco: Never   Vaping Use    Vaping Use: Never used   Substance Use Topics    Alcohol use: No    Drug use: Yes     Types: Marijuana     Family History: family history includes Cancer in his biological mother; No  "Known Problems in his biological father.  Allergies: is allergic to shellfish containing products, crab, and shellfish derived.     Review of Systems   Constitutional: Negative.    HENT: Negative.    Respiratory: Negative.    Cardiovascular: Negative.    Gastrointestinal: Negative.    Endocrine: Negative.    Genitourinary: Negative.    Musculoskeletal: Negative.    Skin: Negative.    Allergic/Immunologic: Negative.    Neurological: Negative.    Hematological: Negative.    Psychiatric/Behavioral: Negative.      The following have been reviewed and updated as appropriate in this visit:          Objective   Vitals:   Visit Vitals  /87 (BP Location: Right upper arm, Patient Position: Sitting)   Pulse 91   Ht 1.676 m (5' 6\")   Wt 63 kg (138 lb 12.8 oz)   BMI 22.40 kg/m²     Physical Exam      Physical Exam   Constitutional: She is oriented to person, place, and time. She appears well-developed and well-nourished.   Eyes: Pupils are equal, round, and reactive to light.   Neck: Normal range of motion. Neck supple.   Cardiovascular: Normal rate, regular rhythm, normal heart sounds and intact distal pulses.    Pulmonary/Chest: Effort normal and breath sounds normal.   Abdominal: Soft. Bowel sounds are normal.   Neurological: She is alert and oriented to person, place, and time.   Skin: Skin is warm and dry.   Psychiatric: She has a normal mood and affect. Her behavior is normal. Judgment and thought content normal.   Breasts- there are no masses in either breast, no retractions skin or nipple, no nipple discharge could be demonstrated, there is no evidence of axillary supraclavicular adenopathy.  He has nodular area in the left breast  There is nodularity in the left upper arm.  Assessment/Plan   Problem List Items Addressed This Visit    None    Pathology showed inflammatory infiltrate of both.  Therapy much about the same.  I will send him to a dermatologist.  No follow-ups on file.       Van Kowalski MD  "

## 2023-11-14 ENCOUNTER — TELEPHONE (OUTPATIENT)
Dept: INTERNAL MEDICINE | Facility: HOSPITAL | Age: 58
End: 2023-11-14
Payer: COMMERCIAL

## 2023-11-14 DIAGNOSIS — N63.21 MASS OF UPPER OUTER QUADRANT OF LEFT BREAST: Primary | ICD-10-CM

## 2023-11-14 NOTE — TELEPHONE ENCOUNTER
Regarding: Breast result  Contact: [phone]  ----- Message from Lora Jensen RN sent at 11/13/2023 12:46 PM EST -----       ----- Message from Rickie Kaminski to Rebecca Blakely MD sent at 11/13/2023 11:36 AM -----   Good Morning.  Dr Kowalski has suggests we see a dermatologist.  Please advise.  Area is not discolored anymore but is hard. Not sure if hardness is spreading.  Rickie is not complaining. Call me when you are able.

## 2023-11-14 NOTE — TELEPHONE ENCOUNTER
I spoke to pt's wife about plan for the breast lesion. Ok to see dermatology, I gave a referral to [facility]. I also suggested we get input from a breast surgeon whether repeat biopsy is needed given FNA performed by Dr Kowalski was largely acellular. I will consult Dr Ferrell and get her input.

## 2023-11-16 ENCOUNTER — TELEPHONE (OUTPATIENT)
Dept: INTERNAL MEDICINE | Facility: HOSPITAL | Age: 58
End: 2023-11-16
Payer: COMMERCIAL

## 2023-11-16 ENCOUNTER — TELEPHONE (OUTPATIENT)
Dept: SURGERY | Facility: CLINIC | Age: 58
End: 2023-11-16

## 2023-11-16 DIAGNOSIS — L30.9 DERMATITIS: ICD-10-CM

## 2023-11-16 DIAGNOSIS — N63.20 MASS OF LEFT BREAST, UNSPECIFIED QUADRANT: Primary | ICD-10-CM

## 2023-11-16 RX ORDER — TRIAMCINOLONE ACETONIDE 1 MG/G
CREAM TOPICAL 2 TIMES DAILY
Qty: 30 G | Refills: 1 | Status: SHIPPED | OUTPATIENT
Start: 2023-11-16 | End: 2024-07-24

## 2023-11-16 NOTE — TELEPHONE ENCOUNTER
Regarding: Breast result  Contact: [phone]  ----- Message from Lora Jensen RN sent at 11/16/2023 10:59 AM EST -----       ----- Message from Rickie Kaminski to Rebecca Blakely MD sent at 11/16/2023  9:51 AM -----   Good Morning.  Kaveh has appointment with Dr. Head on 1/24/24 at 11 am at [facility] ([facility]).  Area is becoming dark again       ----- Message -----       From:Ina Kaminski (proxy for Rickie Kaminski)       Sent:11/13/2023 11:36 AM EST         To:Rebecca Blakely    Subject:Breast result    Good Morning.  Dr Kowalski has suggests we see a dermatologist.  Please advise.  Area is not discolored anymore but is hard. Not sure if hardness is spreading.  Rickie is not complaining. Call me when you are able.

## 2023-11-16 NOTE — TELEPHONE ENCOUNTER
[facility] Appointment Request   Provider: Mariaelena   Appointment Type: New patient  Reason for Visit: Mass left breast, Imaging with ML  Available Day and Time: Anytime   Best Contact Number: Razia Buclkey ([phone])    The practice will reach out to schedule your appointment within the next 2 business days.

## 2023-11-16 NOTE — TELEPHONE ENCOUNTER
I called and spoke with Ina, patient's wife.  She tells me the breast lesion is darker and may be seems to be getting bigger.  The biopsy completed by Dr. Kowalski was largely acellular.  I recommended that he make an appointment with Dr. Ferrell for an exam and second opinion and put in a referral.  The punch biopsy lesion on the arm showed dermatitis.  I recommended they start using a topical steroid on the arm lesion and can put in on the breast lesion although there is a breast mass in that area and I am not convinced that this is just a dermal issue, hence second opinion with breast surgery.  Patient's wife understands and agrees with the plan and I will continue to follow along

## 2023-11-28 ENCOUNTER — OFFICE VISIT (OUTPATIENT)
Dept: SURGERY | Facility: CLINIC | Age: 58
End: 2023-11-28
Payer: COMMERCIAL

## 2023-11-28 ENCOUNTER — HOSPITAL ENCOUNTER (OUTPATIENT)
Dept: RADIOLOGY | Facility: HOSPITAL | Age: 58
Discharge: HOME | End: 2023-11-28
Attending: RADIOLOGY
Payer: COMMERCIAL

## 2023-11-28 ENCOUNTER — HOSPITAL ENCOUNTER (OUTPATIENT)
Dept: RADIOLOGY | Facility: HOSPITAL | Age: 58
Discharge: HOME | End: 2023-11-28
Attending: SURGERY
Payer: COMMERCIAL

## 2023-11-28 VITALS
SYSTOLIC BLOOD PRESSURE: 129 MMHG | BODY MASS INDEX: 20.92 KG/M2 | WEIGHT: 138 LBS | TEMPERATURE: 99 F | HEIGHT: 68 IN | DIASTOLIC BLOOD PRESSURE: 82 MMHG | HEART RATE: 87 BPM | RESPIRATION RATE: 16 BRPM | OXYGEN SATURATION: 99 %

## 2023-11-28 DIAGNOSIS — N63.21 MASS OF UPPER OUTER QUADRANT OF LEFT BREAST: ICD-10-CM

## 2023-11-28 DIAGNOSIS — N63.21 MASS OF UPPER OUTER QUADRANT OF LEFT BREAST: Primary | ICD-10-CM

## 2023-11-28 PROCEDURE — 77065 DX MAMMO INCL CAD UNI: CPT | Mod: LT

## 2023-11-28 PROCEDURE — 76642 ULTRASOUND BREAST LIMITED: CPT | Mod: LT

## 2023-11-28 PROCEDURE — 99214 OFFICE O/P EST MOD 30 MIN: CPT | Performed by: SURGERY

## 2023-11-28 PROCEDURE — 3008F BODY MASS INDEX DOCD: CPT | Performed by: SURGERY

## 2023-11-28 ASSESSMENT — PAIN SCALES - GENERAL: PAINLEVEL: 0-NO PAIN

## 2023-11-28 NOTE — Clinical Note
Please call his wife and set him up for a left mammogram and left ultrasound and an appointment with me in 4 months.  If for some reason you are unable to get in touch with her please document your attempts in the chart.

## 2023-12-06 ASSESSMENT — ENCOUNTER SYMPTOMS
CONFUSION: 1
ENDOCRINE NEGATIVE: 1
EYES NEGATIVE: 1
HEMATOLOGIC/LYMPHATIC NEGATIVE: 1
UNEXPECTED WEIGHT CHANGE: 1
GASTROINTESTINAL NEGATIVE: 1
MUSCULOSKELETAL NEGATIVE: 1
CARDIOVASCULAR NEGATIVE: 1
NEUROLOGICAL NEGATIVE: 1
RESPIRATORY NEGATIVE: 1

## 2023-12-06 NOTE — PROGRESS NOTES
Rickie Kaminski is a 58 y.o. male who presents today for evaluation.     He is here for consultation at the request of Dr. Blakely.  He noticed a mass in his left breast at the end of the summer.  He had a mammogram and ultrasound which showed a focal asymmetry with calcifications in the upper outer left breast.  Ultrasound showed abnormal appearing echogenic tissue in the left breast at the 1:00 radian.  There was also a mildly prominent left axillary lymph node.  He underwent a biopsy and clip placement on October 24, 2023.  This showed fibroadipose tissue with fibrosis and mild chronic inflammation.  He also had a punch biopsy of the lesion of his left arm at the same time that showed inflammatory infiltrate in the dermis.  The biopsy of the left breast is labeled skin biopsy but I verified with Dr. Kowalski and this tissue was clearly taken from the mass within his breast and not the skin of the breast.  He now presents for second opinion and possible additional biopsy.  He does not really feel that the mass has changed significantly since he had the biopsy.    Past Medical History:    Past Medical History:   Diagnosis Date   • Alcoholism (CMS/HCC)    • Epilepsy (CMS/HCC)    • Memory loss        OB History:   OB History   No obstetric history on file.       Surgical History:   Past Surgical History:   Procedure Laterality Date   • BREAST BIOPSY         Social History:   Social History     Social History Narrative   • Not on file       Family History:   Family History   Problem Relation Age of Onset   • Hypertension Biological Mother    • Cancer Biological Mother         Non-Hodgkins Lymphoma   • No Known Problems Biological Father    • Cancer Biological Sister    • No Known Problems Biological Sister    • No Known Problems Biological Brother        Allergies: Shellfish containing products, Crab, and Shellfish derived    Home Medications:  •  cyanocobalamin  •  dorzolamide-timoloL  •  folic acid  •  mirtazapine  •   therapeutic multivitamin  •  thiamine  •  triamcinolone      Review of Studies.  As above I personally reviewed her films and agree with the recommendation.     Review of Systems   Constitutional: Positive for unexpected weight change.   HENT: Negative.    Eyes: Negative.    Respiratory: Negative.    Cardiovascular: Negative.    Gastrointestinal: Negative.    Endocrine: Negative.    Genitourinary: Negative.    Musculoskeletal: Negative.    Skin: Negative.    Allergic/Immunologic: Positive for environmental allergies.   Neurological: Negative.    Hematological: Negative.    Psychiatric/Behavioral: Positive for confusion.         He is a well-developed, well-nourished man in no apparent distress. On HEENT exam there is no cervical adenopathy. There is no scleral icterus. He has appropriate mood and affect. Neck is normal with full range of motion. There are no thyroid masses.  He has symmetric breasts. His breast exam shows an ill-defined rubbery mass in the left breast at the 1:00 radian.  There is small so overlying darkening of the skin.  It is difficult to say the exact etiology but it is also not likely consistent with malignancy.  Otherwise there are no other dominant masses, skin changes, nipple retraction, supraclavicular or axillary adenopathy bilaterally abdomen is soft and nontender without organomegaly. Bilateral lower extremities are without clubbing, cyanosis or edema. On musculoskeletal exam, he moves all extremities without any obvious abnormality; there is no scoliosis.     Impression:   Problem List Items Addressed This Visit        Other    Mass of upper outer quadrant of left breast - Primary    Relevant Orders    BI DIAGNOSTIC MAMMOGRAM LEFT (TOMOSYNTHESIS) (Completed)    ULTRASOUND BREAST LIMITED LEFT    BI DIAGNOSTIC MAMMOGRAM LEFT (TOMOSYNTHESIS)    ULTRASOUND BREAST LIMITED LEFT       Plan: I explained to Rickie and his wife I would like to get follow-up imaging.  If it appears the clip is  appropriately placed I feel we can hold off on additional biopsy.  If it appears that the clip is not concordant with the area seen on the mammogram then we can discuss the need for further biopsy.  They are agreeable with this plan.  He will have a follow-up mammogram and ultrasound today.  I will give his wife a call once I have reviewed the films.    Addendum: I reviewed Rickie's mammogram and ultrasound.  There continues to be a mass in the left breast at the 1:00 radian.  A biopsy clip is noted within the deeper aspect of the mass.  There continues to be overlying skin thickening.  The mildly enlarged left axillary lymph node is stable.  The mass currently measures 5.5 cm which is increased from 4.4 cm.  I left his wife a detailed message.  I am not certain of the etiology but my concern for malignancy is low.  I suspect that this may be some type of autoimmune process or manifestation of systemic disease.  The only other option would be to excise the area but I would not recommend this unless it continues to grow or changes otherwise.  I placed the order for a follow-up mammogram and ultrasound which can be done in 4 months.  I asked his wife to give the office a call to set this up.  Obviously if there is any changes in the meantime I can see her back sooner.  I also sent a message via secure chat to his primary care doctor Dr. Blakely.

## 2023-12-11 ENCOUNTER — HOSPITAL ENCOUNTER (OUTPATIENT)
Dept: RADIOLOGY | Facility: HOSPITAL | Age: 58
Discharge: HOME | End: 2023-12-11
Attending: PODIATRIST
Payer: COMMERCIAL

## 2023-12-11 ENCOUNTER — TRANSCRIBE ORDERS (OUTPATIENT)
Dept: RADIOLOGY | Facility: HOSPITAL | Age: 58
End: 2023-12-11

## 2023-12-11 DIAGNOSIS — S90.32XA CONTUSION OF LEFT FOOT, INITIAL ENCOUNTER: ICD-10-CM

## 2023-12-11 DIAGNOSIS — S90.32XA CONTUSION OF LEFT FOOT, INITIAL ENCOUNTER: Primary | ICD-10-CM

## 2023-12-11 PROCEDURE — 73630 X-RAY EXAM OF FOOT: CPT | Mod: LT

## 2024-01-17 ENCOUNTER — OFFICE VISIT (OUTPATIENT)
Dept: INTERNAL MEDICINE | Facility: HOSPITAL | Age: 59
End: 2024-01-17
Payer: COMMERCIAL

## 2024-01-17 ENCOUNTER — PATIENT OUTREACH (OUTPATIENT)
Dept: CASE MANAGEMENT | Facility: CLINIC | Age: 59
End: 2024-01-17
Payer: COMMERCIAL

## 2024-01-17 VITALS
HEART RATE: 82 BPM | RESPIRATION RATE: 17 BRPM | OXYGEN SATURATION: 100 % | BODY MASS INDEX: 21.25 KG/M2 | SYSTOLIC BLOOD PRESSURE: 128 MMHG | DIASTOLIC BLOOD PRESSURE: 78 MMHG | HEIGHT: 68 IN | WEIGHT: 140.2 LBS | TEMPERATURE: 98.6 F

## 2024-01-17 DIAGNOSIS — Z23 NEED FOR HEPATITIS B VACCINATION: ICD-10-CM

## 2024-01-17 DIAGNOSIS — N63.21 MASS OF UPPER OUTER QUADRANT OF LEFT BREAST: Primary | ICD-10-CM

## 2024-01-17 DIAGNOSIS — E53.8 B12 DEFICIENCY: ICD-10-CM

## 2024-01-17 DIAGNOSIS — F10.29 ALCOHOL DEPENDENCE WITH UNSPECIFIED ALCOHOL-INDUCED DISORDER: ICD-10-CM

## 2024-01-17 PROCEDURE — 90471 IMMUNIZATION ADMIN: CPT | Performed by: FAMILY MEDICINE

## 2024-01-17 PROCEDURE — 3E0236Z INTRODUCTION OF NUTRITIONAL SUBSTANCE INTO MUSCLE, PERCUTANEOUS APPROACH: ICD-10-PCS | Performed by: FAMILY MEDICINE

## 2024-01-17 PROCEDURE — 3E0234Z INTRODUCTION OF SERUM, TOXOID AND VACCINE INTO MUSCLE, PERCUTANEOUS APPROACH: ICD-10-PCS | Performed by: FAMILY MEDICINE

## 2024-01-17 PROCEDURE — 99900024 HB NONBILLABLE HOSPITAL CLINIC SERVICE: Mod: 25 | Performed by: FAMILY MEDICINE

## 2024-01-17 PROCEDURE — 99214 OFFICE O/P EST MOD 30 MIN: CPT | Mod: 25 | Performed by: FAMILY MEDICINE

## 2024-01-17 PROCEDURE — 3008F BODY MASS INDEX DOCD: CPT | Performed by: FAMILY MEDICINE

## 2024-01-17 PROCEDURE — 96372 THER/PROPH/DIAG INJ SC/IM: CPT | Performed by: FAMILY MEDICINE

## 2024-01-17 PROCEDURE — 63600000 HC DRUGS/DETAIL CODE: Mod: JZ | Performed by: FAMILY MEDICINE

## 2024-01-17 RX ORDER — CYANOCOBALAMIN 1000 UG/ML
1000 INJECTION, SOLUTION INTRAMUSCULAR; SUBCUTANEOUS ONCE
Status: COMPLETED | OUTPATIENT
Start: 2024-01-17 | End: 2024-01-17

## 2024-01-17 RX ADMIN — CYANOCOBALAMIN 1000 MCG: 1000 INJECTION, SOLUTION INTRAMUSCULAR at 13:57

## 2024-01-17 ASSESSMENT — PAIN SCALES - GENERAL: PAINLEVEL: 0-NO PAIN

## 2024-01-17 ASSESSMENT — PATIENT HEALTH QUESTIONNAIRE - PHQ9: SUM OF ALL RESPONSES TO PHQ9 QUESTIONS 1 & 2: 0

## 2024-01-17 NOTE — PROGRESS NOTES
"DIANA received request from Dr. Blakely to meet with Rickie to support psychosocial and access to behavioral health. He's interested in inpatient detox and rehab.    Rickie is known to this SW from past outreach 3/18/2021.  Rickie is a 54YO w/ dx that include alcohol use disorder (3+ times inpatient rehab), glaucoma, B12 deficiency, mild cognitive impairment d/t hx substance use. He has  and [facility].    Living & Supports  Rickie lives with his wife, Ina. They don't have children. 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   Rickie was working at [employer], but left the job in 2010/2011 d/t alcohol and drug abuse. He's been inpatient and group home in [place], as well as [facility] here and another facility they can't remember in [place]. He admits that alcohol and marijuana abuse effected his ability to hold and maintain a job.    He's reapplying for SSDI and has an medical evaluation scheduled for 1/30/24.    Current Goals  Rickie recently lost his mother and is coping with adjustment, grief and loss. He feels this is driving him to change his current alcohol consumption and because \"he's getting old\" and feels it's time to stop. PCP is recommending medically monitored detox d/t length of alcohol use and current use of three 24 ounce malt liquor/day. Rickie also agreeable to inpatient rehab following detox. We discussed the importance of outpatient f/u following d/c and peer support. He understands. He didn't feel AA was effective in the past d/t feeling triggered by groups to continue drinking. Would benefit from CPS.     Rickie has no mental health treatment hx other than substance use noted. He has no hx or active thoughts of self harm or harm to others. He's generally in good spirits and motivated for support.    Referrals  We discussed option to present to [facility] vs attempt " for PCP practice to admit to detox/rehab from community. They're agreeable to attempt PCP office admission. D/t SSA appt, would prefer to wait until 2/1/24.    SW contacted [facility], first patient choice, and was informed that they no longer in par with [facility]. Recommended [facility]. SW contacted [facility] for info on clinicals needed for inpatient submission - would need current clinical summary and hx, as well as mental health summary noting any behavioral risks. Willing to review without psychiatrist or psychologist eval and accept from PCP and SW note. All patients entering detox/rehab receive UDS on site.    Some [facility] Detox/Rehab choices:  [facility]  [facility]    Will call patient/wife for provider choice and send referral closer to admission date choice.    Brittni Leary, PRIMITIVO  [phone]

## 2024-01-17 NOTE — PROGRESS NOTES
SUBJECTIVE    Rickie Kaminski is a 58 y.o. male who has a history of alcohol use disorder, cognitive impairment, b12 deficiency, fatty liver, breast mass who presents for routine follow up    He comes today with Ina, his wife and primary caregiver     He has a left breast mass/lesion- had a birads 4 mammogram 10/20/23  Biopsy by Dr Kowalski 11/07/23- path was benign - fat fibrosis  Has follow up mammogram and breast surgery appointment scheduled in April with Dr Maki  He has no pain and hasn't noticed any appreciable change in size  He has an upcoming appointment with Dr Arriaza, dermatology, to take a look at the lesion as it appears somewhat dermal/overlying skin changes    He is drinking 3 24 ounce cans 8% malt liquor daily  No history of withdrawal seizures, no history of alcohol withdrawal hospitalization  He does have shakes in his hands  He drinks anytime during the day, last drink was last night  He used to drink a fifth of hard alcohol daily, stopped that about three years ago  Has done inpatient detox/rehab in the past distantly  Previously was declining treatment but today is open to inpatient treatment and attempting recovery  He feels he would need an inpatient stay over outpatient IOP  The loss of his mother and his advancing age are both factors in new desire to start recovery from alcoho    He has a history of cognitive decline with memory impairment preventing him from working  He saw neurologist at [place], Dr Stroud, in Oct, 2021  His MRI brain showed cortical atrophy  Dr Stroud advised his cognitive difficulty was a result of his long term heavy alcohol use and advised he should stop drinking alcohol    He has no other health concerns and tells me he feels well without pain, shortness of breath or chest pain    UTD flu shot   Agrees to second hep b and b12 injection today    Wt Readings from Last 15 Encounters:   01/17/24 63.6 kg (140 lb 3.2 oz)   11/28/23 62.6 kg (138 lb)   11/07/23 63 kg  "(138 lb 12.8 oz)   10/24/23 59 kg (130 lb)   10/20/23 60.2 kg (132 lb 12.8 oz)   06/13/23 62.1 kg (137 lb)   02/08/23 67 kg (147 lb 12.8 oz)   10/04/22 67.8 kg (149 lb 6.4 oz)   05/04/22 70.9 kg (156 lb 3.2 oz)   01/04/22 65.8 kg (145 lb)   10/01/21 73.9 kg (163 lb)   07/19/21 79.4 kg (175 lb)   06/30/21 73.5 kg (162 lb)   05/20/21 71.7 kg (158 lb)   04/03/21 76.2 kg (168 lb)     Past Medical History:   Diagnosis Date   • Alcoholism (CMS/HCC)    • Epilepsy (CMS/[facility])    • Memory loss      Social History     Tobacco Use   • Smoking status: Never     Passive exposure: Never   • Smokeless tobacco: Never   Vaping Use   • Vaping Use: Never used   Substance Use Topics   • Alcohol use: Yes     Comment: alcoholic   • Drug use: Yes     Types: Marijuana     Current Medications:   Current Outpatient Medications:   •  cyanocobalamin (VITAMIN B12) 1,000 mcg tablet, Take 1 tablet (1,000 mcg total) by mouth daily., Disp: 90 tablet, Rfl: 3  •  dorzolamide-timoloL (COSOPT) 22.3-6.8 mg/mL ophthalmic solution, instill 1 drop in both eyes 2x daily, Disp: , Rfl:   •  folic acid (FOLVITE) 1 mg tablet, Take 1 tablet (1 mg total) by mouth daily., Disp: 90 tablet, Rfl: 3  •  mirtazapine (REMERON) 7.5 mg tablet, Take 1 tablet (7.5 mg total) by mouth nightly., Disp: 90 tablet, Rfl: 3  •  therapeutic multivitamin (THERAGRAN) tablet, Take 1 tablet by mouth daily., Disp: 90 tablet, Rfl: 3  •  thiamine 100 mg tablet, Take 1 tablet (100 mg total) by mouth daily., Disp: 90 tablet, Rfl: 3  •  triamcinolone (KENALOG) 0.1 % cream, Apply topically 2 (two) times a day., Disp: 30 g, Rfl: 1    Allergies: Shellfish containing products, Crab, and Shellfish derived    Objective   Physical Exam:  Visit Vitals  /78   Pulse 82   Temp 37 °C (98.6 °F) (Temporal)   Resp 17   Ht 1.727 m (5' 8\")   Wt 63.6 kg (140 lb 3.2 oz)   SpO2 100%   BMI 21.32 kg/m²      Physical Exam  Vitals reviewed.   Constitutional:       Appearance: Normal appearance.   Cardiovascular: "      Rate and Rhythm: Normal rate and regular rhythm.   Pulmonary:      Effort: Pulmonary effort is normal.      Breath sounds: Normal breath sounds.   Chest:      Comments: 3x3.5cm firm lesion of left breast with overlying skin darkening  No tenderness or fluctuance  Neurological:      Mental Status: He is alert and oriented to person, place, and time.   Psychiatric:         Mood and Affect: Mood normal.         Behavior: Behavior normal.         Thought Content: Thought content normal.         Judgment: Judgment normal.         Lab Results   Component Value Date    CHOL 182 09/26/2022    CHOL 196 12/07/2020    CHOL 196 08/12/2017     Lab Results   Component Value Date    HDL 93 09/26/2022    HDL 89 12/07/2020    HDL 87 08/12/2017     Lab Results   Component Value Date    LDLCALC 74 09/26/2022    LDLCALC 91 12/07/2020    LDLCALC 85 08/12/2017     Lab Results   Component Value Date    TRIG 86 09/26/2022    TRIG 93 12/07/2020    TRIG 122 08/12/2017     The 10-year ASCVD risk score (Vicki GUAMAN, et al., 2019) is: 10.9%    Values used to calculate the score:      Age: 58 years      Sex: Male      Is Non- : Yes      Diabetic: No      Tobacco smoker: No      Systolic Blood Pressure: 128 mmHg      Is BP treated: Yes      HDL Cholesterol: 93 mg/dL      Total Cholesterol: 182 mg/dL    Assessment & Plan:  Problem List Items Addressed This Visit        Unprioritized    Alcohol dependence (CMS/HCC)     He has a decades long history of alcohol use disorder  Now open to attempt recovery  Met will social work today  He is medically cleared pending stable labs for an inpatient program  I congratulated him on his decision and advised I will support him in this  Likely for admission early next month after SSI farzanaal  Advised not to stop drinking cold turkey now but did encourage cutting back on volume  No history of withdrawal seizures           Relevant Orders    CBC and differential    Comprehensive metabolic  panel    Protime-INR    B12 deficiency     Continue supplementation         Mass of upper outer quadrant of left breast - Primary     I was concerned for malignancy on first evaluation but biopsy showed fibrosis  He will see dermatology next week as scheduled and will have a follow up mammogram and breast surgeon evaluation in 3 months        Other Visit Diagnoses     Need for hepatitis B vaccination        Relevant Orders    Hepatitis B vaccine (Heplisav) adult IM (Completed)          Return in about 3 months (around 4/17/2024).  Rebecca Blakely MD

## 2024-01-19 LAB
ALBUMIN SERPL-MCNC: 3.4 G/DL (ref 3.8–4.9)
ALBUMIN/GLOB SERPL: 0.8 {RATIO} (ref 1.2–2.2)
ALP SERPL-CCNC: 89 IU/L (ref 44–121)
ALT SERPL-CCNC: 13 IU/L (ref 0–44)
AST SERPL-CCNC: 24 IU/L (ref 0–40)
BASOPHILS # BLD AUTO: 0 X10E3/UL (ref 0–0.2)
BASOPHILS NFR BLD AUTO: 1 %
BILIRUB SERPL-MCNC: 0.4 MG/DL (ref 0–1.2)
BUN SERPL-MCNC: 5 MG/DL (ref 8–27)
BUN/CREAT SERPL: 6 (ref 10–24)
CALCIUM SERPL-MCNC: 8.6 MG/DL (ref 8.6–10.2)
CHLORIDE SERPL-SCNC: 100 MMOL/L (ref 96–106)
CO2 SERPL-SCNC: 27 MMOL/L (ref 20–29)
CREAT SERPL-MCNC: 0.84 MG/DL (ref 0.76–1.27)
EGFRCR SERPLBLD CKD-EPI 2021: 100 ML/MIN/1.73
EOSINOPHIL # BLD AUTO: 0.2 X10E3/UL (ref 0–0.4)
EOSINOPHIL NFR BLD AUTO: 5 %
ERYTHROCYTE [DISTWIDTH] IN BLOOD BY AUTOMATED COUNT: 13.9 % (ref 11.6–15.4)
GLOBULIN SER CALC-MCNC: 4.2 G/DL (ref 1.5–4.5)
GLUCOSE SERPL-MCNC: 69 MG/DL (ref 70–99)
HCT VFR BLD AUTO: 48.1 % (ref 37.5–51)
HGB BLD-MCNC: 15.3 G/DL (ref 13–17.7)
IMM GRANULOCYTES # BLD AUTO: 0 X10E3/UL (ref 0–0.1)
IMM GRANULOCYTES NFR BLD AUTO: 0 %
INR PPP: 1 (ref 0.9–1.2)
LYMPHOCYTES # BLD AUTO: 1.7 X10E3/UL (ref 0.7–3.1)
LYMPHOCYTES NFR BLD AUTO: 54 %
MCH RBC QN AUTO: 25.2 PG (ref 26.6–33)
MCHC RBC AUTO-ENTMCNC: 31.8 G/DL (ref 31.5–35.7)
MCV RBC AUTO: 79 FL (ref 79–97)
MONOCYTES # BLD AUTO: 0.4 X10E3/UL (ref 0.1–0.9)
MONOCYTES NFR BLD AUTO: 12 %
MORPHOLOGY BLD-IMP: ABNORMAL
NEUTROPHILS # BLD AUTO: 0.9 X10E3/UL (ref 1.4–7)
NEUTROPHILS NFR BLD AUTO: 28 %
PLATELET # BLD AUTO: 224 X10E3/UL (ref 150–450)
POTASSIUM SERPL-SCNC: 4.3 MMOL/L (ref 3.5–5.2)
PROT SERPL-MCNC: 7.6 G/DL (ref 6–8.5)
PROTHROMBIN TIME: 10.8 SEC (ref 9.1–12)
RBC # BLD AUTO: 6.08 X10E6/UL (ref 4.14–5.8)
SODIUM SERPL-SCNC: 140 MMOL/L (ref 134–144)
WBC # BLD AUTO: 3.1 X10E3/UL (ref 3.4–10.8)

## 2024-01-19 NOTE — ASSESSMENT & PLAN NOTE
I was concerned for malignancy on first evaluation but biopsy showed fibrosis  He will see dermatology next week as scheduled and will have a follow up mammogram and breast surgeon evaluation in 3 months

## 2024-01-19 NOTE — ASSESSMENT & PLAN NOTE
He has a decades long history of alcohol use disorder  Now open to attempt recovery  Met will social work today  He is medically cleared pending stable labs for an inpatient program  I congratulated him on his decision and advised I will support him in this  Likely for admission early next month after SSI елена  Advised not to stop drinking cold turkey now but did encourage cutting back on volume  No history of withdrawal seizures

## 2024-01-24 ENCOUNTER — APPOINTMENT (RX ONLY)
Dept: URBAN - METROPOLITAN AREA CLINIC 28 | Facility: CLINIC | Age: 59
Setting detail: DERMATOLOGY
End: 2024-01-24

## 2024-01-24 DIAGNOSIS — L70.8 OTHER ACNE: ICD-10-CM

## 2024-01-24 DIAGNOSIS — N62 HYPERTROPHY OF BREAST: ICD-10-CM

## 2024-01-24 DIAGNOSIS — D485 NEOPLASM OF UNCERTAIN BEHAVIOR OF SKIN: ICD-10-CM

## 2024-01-24 PROBLEM — D48.5 NEOPLASM OF UNCERTAIN BEHAVIOR OF SKIN: Status: ACTIVE | Noted: 2024-01-24

## 2024-01-24 PROCEDURE — ? COUNSELING

## 2024-01-24 PROCEDURE — ? BIOPSY BY PUNCH METHOD

## 2024-01-24 PROCEDURE — 11104 PUNCH BX SKIN SINGLE LESION: CPT

## 2024-01-24 PROCEDURE — 99202 OFFICE O/P NEW SF 15 MIN: CPT | Mod: 25

## 2024-01-24 PROCEDURE — 10040 EXTRACTION: CPT

## 2024-01-24 PROCEDURE — ? ACNE SURGERY

## 2024-01-24 ASSESSMENT — LOCATION DETAILED DESCRIPTION DERM
LOCATION DETAILED: LEFT ANTERIOR SHOULDER
LOCATION DETAILED: LEFT LATERAL INFERIOR CHEST
LOCATION DETAILED: RIGHT LATERAL INFERIOR CHEST
LOCATION DETAILED: RIGHT LATERAL INFERIOR CHEST
LOCATION DETAILED: LEFT ANTERIOR SHOULDER

## 2024-01-24 ASSESSMENT — LOCATION SIMPLE DESCRIPTION DERM
LOCATION SIMPLE: CHEST
LOCATION SIMPLE: CHEST
LOCATION SIMPLE: LEFT SHOULDER
LOCATION SIMPLE: LEFT SHOULDER

## 2024-01-24 ASSESSMENT — LOCATION ZONE DERM
LOCATION ZONE: TRUNK
LOCATION ZONE: ARM
LOCATION ZONE: ARM
LOCATION ZONE: TRUNK

## 2024-01-24 NOTE — PROCEDURE: COUNSELING
Detail Level: Detailed
Patient Specific Counseling (Will Not Stick From Patient To Patient): likely related to underlying liver disease from alcoholism - will f/u with PCP

## 2024-01-24 NOTE — PROCEDURE: ACNE SURGERY
Detail Level: Zone
Render Post-Care Instructions In Note?: no
Prep Text (Optional): Prior to removal the treatment areas were prepped in the usual fashion.
Post-Care Instructions: I reviewed with the patient in detail post-care instructions. Patient is to keep the treatment areas dry overnight, and then apply bacitracin twice daily until healed. Patient may apply hydrogen peroxide soaks to remove any crusting.
Acne Type: Comedonal Lesions
Render Number Of Lesions Treated: yes
Consent was obtained and risks were reviewed including but not limited to scarring, infection, bleeding, scabbing, incomplete removal, and allergy to anesthesia.
Extraction Method: physical extraction

## 2024-02-01 ENCOUNTER — PATIENT OUTREACH (OUTPATIENT)
Dept: CASE MANAGEMENT | Facility: CLINIC | Age: 59
End: 2024-02-01
Payer: COMMERCIAL

## 2024-02-01 NOTE — PROGRESS NOTES
DIANA completed f/u with Rickie's wife, Ina, to support access to behavioral health care.    Ina says that Rickie's goal continues for admission to detox and rehab. She says they completed SSA medical eval on 1/30/24 and the mental health eval was rescheduled for 2/2/24. We reviewed that [facility] unable to accept Rickie's insurance and we reviewed other provider options. DIANA emailed her provider list:    [facility]  [address] [place] [address] [phone]    [facility]   [address] [place] [phone]   [facility]     [address] [address]   [phone]   [address] [address]   [phone]    [facility]   [address], [place] [id number] [phone]   [facility]   [address]   [place] [address] [phone]   [facility]    Rickie and Ina to review. They request a call back 2/2/24 for further support following SSA appt.     Will continue to follow.        PRIMITIVO Roche  [phone]

## 2024-02-01 NOTE — PROGRESS NOTES
Per Ina, Rickie prefers to go to [facility] for detox/rehab.   SW called [facility] and confirmed that they're open 24/7 and accept walk-in admissions. They recommend calling before walk-in to make sure bed available and to get an understanding of when bed will be available. Provided this info to Ina.    Will continue to follow.

## 2024-02-09 ENCOUNTER — PATIENT OUTREACH (OUTPATIENT)
Dept: CASE MANAGEMENT | Facility: CLINIC | Age: 59
End: 2024-02-09
Payer: COMMERCIAL

## 2024-02-09 NOTE — PROGRESS NOTES
SW f/u completed with Rickie's wife for continued support with access to .    Ina says that Rickie has delayed admission to detox/rehab. Was willing to go, then waited too long to finish the SSA appts. Now is pushing back the admission date and she says he's on a current binge.    Discussed potential f/u visit with PCP or waiting again until next visit.    Ina says she called PCP for opinion regarding dermatology visit and biopsy. Records in chart. Message sent to PCP for recommendation and guidance.    Ina understands that Rickie can present to ED for immediate support        PRIMITIVO Roche  [phone]

## 2024-02-12 ENCOUNTER — TELEPHONE (OUTPATIENT)
Dept: INTERNAL MEDICINE | Facility: HOSPITAL | Age: 59
End: 2024-02-12
Payer: COMMERCIAL

## 2024-02-12 DIAGNOSIS — S90.32XS CONTUSION OF LEFT FOOT, SEQUELA: Primary | ICD-10-CM

## 2024-02-12 NOTE — PROGRESS NOTES
PCP recommending scheduling Rickie appt for further discussion and support. Left VM for Ina encouraging scheduling.

## 2024-02-12 NOTE — TELEPHONE ENCOUNTER
Pt has an appt today at [facility], and an ambulatory referral is being requested to see the podiatrist.

## 2024-02-13 ENCOUNTER — TELEPHONE (OUTPATIENT)
Dept: INTERNAL MEDICINE | Facility: HOSPITAL | Age: 59
End: 2024-02-13
Payer: COMMERCIAL

## 2024-02-13 NOTE — TELEPHONE ENCOUNTER
Notified pt that the referral to podiatry was completed by Dr. Blakely yesterday and is in the system

## 2024-02-26 ENCOUNTER — APPOINTMENT (RX ONLY)
Dept: URBAN - METROPOLITAN AREA CLINIC 28 | Facility: CLINIC | Age: 59
Setting detail: DERMATOLOGY
End: 2024-02-26

## 2024-02-26 DIAGNOSIS — L738 OTHER SPECIFIED DISEASES OF HAIR AND HAIR FOLLICLES: ICD-10-CM | Status: INADEQUATELY CONTROLLED

## 2024-02-26 DIAGNOSIS — D485 NEOPLASM OF UNCERTAIN BEHAVIOR OF SKIN: ICD-10-CM

## 2024-02-26 DIAGNOSIS — L663 OTHER SPECIFIED DISEASES OF HAIR AND HAIR FOLLICLES: ICD-10-CM | Status: INADEQUATELY CONTROLLED

## 2024-02-26 DIAGNOSIS — L73.9 FOLLICULAR DISORDER, UNSPECIFIED: ICD-10-CM | Status: INADEQUATELY CONTROLLED

## 2024-02-26 PROBLEM — D48.5 NEOPLASM OF UNCERTAIN BEHAVIOR OF SKIN: Status: ACTIVE | Noted: 2024-02-26

## 2024-02-26 PROBLEM — L02.92 FURUNCLE, UNSPECIFIED: Status: ACTIVE | Noted: 2024-02-26

## 2024-02-26 PROCEDURE — ? COUNSELING

## 2024-02-26 PROCEDURE — ? PRESCRIPTION

## 2024-02-26 PROCEDURE — ? BIOPSY BY PUNCH METHOD

## 2024-02-26 PROCEDURE — 11104 PUNCH BX SKIN SINGLE LESION: CPT

## 2024-02-26 PROCEDURE — 99214 OFFICE O/P EST MOD 30 MIN: CPT | Mod: 25

## 2024-02-26 RX ORDER — CLINDAMYCIN PHOSPHATE 10 MG/ML
LOTION TOPICAL QDAY
Qty: 60 | Refills: 3 | Status: ERX | COMMUNITY
Start: 2024-02-26

## 2024-02-26 RX ORDER — BENZOYL PEROXIDE 100 MG/G
LOTION TOPICAL QDAY
Qty: 237 | Refills: 3 | Status: ERX | COMMUNITY
Start: 2024-02-26

## 2024-02-26 RX ADMIN — CLINDAMYCIN PHOSPHATE: 10 LOTION TOPICAL at 00:00

## 2024-02-26 RX ADMIN — BENZOYL PEROXIDE: 100 LOTION TOPICAL at 00:00

## 2024-02-26 ASSESSMENT — LOCATION ZONE DERM: LOCATION ZONE: TRUNK

## 2024-02-26 ASSESSMENT — LOCATION DETAILED DESCRIPTION DERM: LOCATION DETAILED: LEFT LATERAL INFERIOR CHEST

## 2024-02-26 ASSESSMENT — LOCATION SIMPLE DESCRIPTION DERM: LOCATION SIMPLE: CHEST

## 2024-02-26 NOTE — PROCEDURE: COUNSELING
Detail Level: Detailed
Patient Specific Counseling (Will Not Stick From Patient To Patient): Biopsy not concerning for malignancy - favors more panniculitis - could this be related to hx alcoholism - ?pancreatic panniculitis - given ambiguity, repeating with larger and deeper biopsy.

## 2024-02-26 NOTE — PROCEDURE: BIOPSY BY PUNCH METHOD
Detail Level: Detailed
Was A Bandage Applied: Yes
Punch Size In Mm: 10
Size Of Lesion In Cm (Optional): 0
Depth Of Punch Biopsy: dermis
Biopsy Type: H and E
Anesthesia Type: 1% lidocaine with epinephrine
Anesthesia Volume In Cc: 4
Hemostasis: Ligature
Deep Sutures: 5-0 Monocryl
Epidermal Sutures: 4-0 Ethilon
Number Of Epidermal Sutures (Optional): 1
Wound Care: Petrolatum
Dressing: bandage
Suture Removal: 14 days
Patient Will Remove Sutures At Home?: No
Lab: 6
Lab Facility: 3
Consent: Written consent was obtained and risks were reviewed including but not limited to scarring, infection, bleeding, scabbing, incomplete removal, nerve damage and allergy to anesthesia.
Post-Care Instructions: I reviewed with the patient in detail post-care instructions. Patient is to keep the biopsy site dry overnight, and then apply bacitracin twice daily until healed. Patient may apply hydrogen peroxide soaks to remove any crusting.
Home Suture Removal Text: Patient was provided a home suture removal kit and will remove their sutures at home.  If they have any questions or difficulties they will call the office.
Notification Instructions: Patient will be notified of biopsy results. However, patient instructed to call the office if not contacted within 2 weeks.
Billing Type: Third-Party Bill
Information: Selecting Yes will display possible errors in your note based on the variables you have selected. This validation is only offered as a suggestion for you. PLEASE NOTE THAT THE VALIDATION TEXT WILL BE REMOVED WHEN YOU FINALIZE YOUR NOTE. IF YOU WANT TO FAX A PRELIMINARY NOTE YOU WILL NEED TO TOGGLE THIS TO 'NO' IF YOU DO NOT WANT IT IN YOUR FAXED NOTE.

## 2024-03-04 ENCOUNTER — OFFICE VISIT (OUTPATIENT)
Dept: INTERNAL MEDICINE | Facility: HOSPITAL | Age: 59
End: 2024-03-04
Attending: FAMILY MEDICINE
Payer: COMMERCIAL

## 2024-03-04 VITALS
SYSTOLIC BLOOD PRESSURE: 112 MMHG | OXYGEN SATURATION: 100 % | TEMPERATURE: 99.1 F | HEART RATE: 75 BPM | BODY MASS INDEX: 21.17 KG/M2 | RESPIRATION RATE: 17 BRPM | HEIGHT: 66 IN | DIASTOLIC BLOOD PRESSURE: 74 MMHG | WEIGHT: 131.7 LBS

## 2024-03-04 DIAGNOSIS — N63.21 MASS OF UPPER OUTER QUADRANT OF LEFT BREAST: ICD-10-CM

## 2024-03-04 DIAGNOSIS — R05.1 ACUTE COUGH: Primary | ICD-10-CM

## 2024-03-04 DIAGNOSIS — Z23 NEED FOR COVID-19 VACCINE: ICD-10-CM

## 2024-03-04 DIAGNOSIS — F10.29 ALCOHOL DEPENDENCE WITH UNSPECIFIED ALCOHOL-INDUCED DISORDER: ICD-10-CM

## 2024-03-04 PROCEDURE — 99900024 HB NONBILLABLE HOSPITAL CLINIC SERVICE: Mod: 25 | Performed by: FAMILY MEDICINE

## 2024-03-04 PROCEDURE — 3008F BODY MASS INDEX DOCD: CPT | Performed by: FAMILY MEDICINE

## 2024-03-04 PROCEDURE — 99214 OFFICE O/P EST MOD 30 MIN: CPT | Mod: 25 | Performed by: FAMILY MEDICINE

## 2024-03-04 PROCEDURE — 91322 SARSCOV2 VAC 50 MCG/0.5ML IM: CPT | Performed by: FAMILY MEDICINE

## 2024-03-04 PROCEDURE — XW023W7 INTRODUCTION OF COVID-19 VACCINE BOOSTER INTO MUSCLE, PERCUTANEOUS APPROACH, NEW TECHNOLOGY GROUP 7: ICD-10-PCS | Performed by: FAMILY MEDICINE

## 2024-03-04 RX ORDER — BENZONATATE 100 MG/1
100 CAPSULE ORAL 3 TIMES DAILY PRN
Qty: 30 CAPSULE | Refills: 0 | Status: SHIPPED | OUTPATIENT
Start: 2024-03-04 | End: 2024-03-14

## 2024-03-04 NOTE — PROGRESS NOTES
"SUBJECTIVE    Rickie Kaminski is a 58 y.o. male who has a history of alcohol use disorder, cognitive impairment, b12 deficiency, fatty liver, breast mass who presents for routine follow up    He comes today as usual with Ina, his wife and primary caregiver     He has a left breast mass/lesion- had a birads 4 mammogram 10/20/23  Biopsy by Dr Kowalski 11/07/23- path was benign - fat fibrosis  He followed up with Dr Maki at my request and is also seeing Dr Arriaza, dermatology  Dr Arriaza completed another biopsy, path is pending  He has a follow up with Dr Maki breast surgery with repeat mammogram scheduled on 4/17/24    He is drinking 3 24 ounce cans 8% malt liquor daily  No history of withdrawal seizures, no history of alcohol withdrawal hospitalization  He does wish to stop drinking, he met with myself and our SW at last appointment to discuss options  He recently went on a \"dawn\" per his wife, patient tells me he was with family and there is a lot of AUD in his family  Plan is for inpatient treatment when he has completed some of his medical evaluations for breast mass and completed his SSI evals    He has a history of cognitive decline with memory impairment preventing him from working  He saw neurologist at [place], Dr Stroud, in Oct, 2021  His MRI brain showed cortical atrophy  Dr Stroud advised his cognitive difficulty was a result of his long term heavy alcohol use and advised he should stop drinking alcohol   He has a follow up with Dr Stroud scheduled for May    He has recently had a mild viral illness with a cough over the last couple of weeks  No fevers or shortness of breath    UTD flu shot   Agreeable to covid booster today    Wt Readings from Last 15 Encounters:   03/04/24 59.7 kg (131 lb 11.2 oz)   01/17/24 63.6 kg (140 lb 3.2 oz)   11/28/23 62.6 kg (138 lb)   11/07/23 63 kg (138 lb 12.8 oz)   10/24/23 59 kg (130 lb)   10/20/23 60.2 kg (132 lb 12.8 oz)   06/13/23 62.1 kg (137 lb)   02/08/23 " "67 kg (147 lb 12.8 oz)   10/04/22 67.8 kg (149 lb 6.4 oz)   05/04/22 70.9 kg (156 lb 3.2 oz)   01/04/22 65.8 kg (145 lb)   10/01/21 73.9 kg (163 lb)   07/19/21 79.4 kg (175 lb)   06/30/21 73.5 kg (162 lb)   05/20/21 71.7 kg (158 lb)     Past Medical History:   Diagnosis Date   • Alcoholism (CMS/[facility])    • Epilepsy (CMS/[facility])    • Memory loss      Social History     Tobacco Use   • Smoking status: Never     Passive exposure: Never   • Smokeless tobacco: Never   Vaping Use   • Vaping Use: Never used   Substance Use Topics   • Alcohol use: Yes     Comment: alcoholic   • Drug use: Yes     Types: Marijuana     Current Medications:   Current Outpatient Medications:   •  benzonatate (TESSALON PERLES) 100 mg capsule, Take 1 capsule (100 mg total) by mouth 3 (three) times a day as needed for cough for up to 10 days., Disp: 30 capsule, Rfl: 0  •  cyanocobalamin (VITAMIN B12) 1,000 mcg tablet, Take 1 tablet (1,000 mcg total) by mouth daily., Disp: 90 tablet, Rfl: 3  •  dorzolamide-timoloL (COSOPT) 22.3-6.8 mg/mL ophthalmic solution, instill 1 drop in both eyes 2x daily, Disp: , Rfl:   •  folic acid (FOLVITE) 1 mg tablet, Take 1 tablet (1 mg total) by mouth daily., Disp: 90 tablet, Rfl: 3  •  mirtazapine (REMERON) 7.5 mg tablet, Take 1 tablet (7.5 mg total) by mouth nightly., Disp: 90 tablet, Rfl: 3  •  therapeutic multivitamin (THERAGRAN) tablet, Take 1 tablet by mouth daily., Disp: 90 tablet, Rfl: 3  •  thiamine 100 mg tablet, Take 1 tablet (100 mg total) by mouth daily., Disp: 90 tablet, Rfl: 3  •  triamcinolone (KENALOG) 0.1 % cream, Apply topically 2 (two) times a day., Disp: 30 g, Rfl: 1    Allergies: Shellfish containing products, Crab, and Shellfish derived    Objective   Physical Exam:  Visit Vitals  /74 (BP Location: Right upper arm, Patient Position: Sitting)   Pulse 75   Temp 37.3 °C (99.1 °F) (Temporal)   Resp 17   Ht 1.676 m (5' 6\")   Wt 59.7 kg (131 lb 11.2 oz)   SpO2 100%   BMI 21.26 kg/m²      Physical " Exam  Constitutional:       Appearance: Normal appearance.   Cardiovascular:      Rate and Rhythm: Normal rate and regular rhythm.   Pulmonary:      Effort: Pulmonary effort is normal. No respiratory distress.      Breath sounds: Normal breath sounds. No wheezing or rhonchi.   Neurological:      Mental Status: He is alert and oriented to person, place, and time.   Psychiatric:         Mood and Affect: Mood normal.         Behavior: Behavior normal.         Thought Content: Thought content normal.         Lab Results   Component Value Date    CHOL 182 09/26/2022    CHOL 196 12/07/2020    CHOL 196 08/12/2017     Lab Results   Component Value Date    HDL 93 09/26/2022    HDL 89 12/07/2020    HDL 87 08/12/2017     Lab Results   Component Value Date    LDLCALC 74 09/26/2022    LDLCALC 91 12/07/2020    LDLCALC 85 08/12/2017     Lab Results   Component Value Date    TRIG 86 09/26/2022    TRIG 93 12/07/2020    TRIG 122 08/12/2017     The 10-year ASCVD risk score (Vicki GUAMAN, et al., 2019) is: 8.6%    Values used to calculate the score:      Age: 58 years      Sex: Male      Is Non- : Yes      Diabetic: No      Tobacco smoker: No      Systolic Blood Pressure: 112 mmHg      Is BP treated: Yes      HDL Cholesterol: 93 mg/dL      Total Cholesterol: 182 mg/dL    Assessment & Plan:  Problem List Items Addressed This Visit        Unprioritized    Alcohol dependence (CMS/HCC)     Desires to quit  Will pursue inpatient treatment options when he has completed follow up for breast mass- mid April  Encouraged him to continue to try to cut back on alcohol use but discouraged complete cold turkey cessation without medical monitoring  I encouraged him to continue vitamin supplementation- b12, thiamine and folate         Acute cough - Primary     Post viral cough  Clear lungs on exam  Normal vital signs  Sent tessalon perles  Call if worsening or fails to improve         Relevant Medications    benzonatate (TESSALON  PERLES) 100 mg capsule    Mass of upper outer quadrant of left breast     Following with breast surgery and dermatology  Most recent biopsy by dermatology is pending  Will have repeat mammogram and breast surgery visit next month        Other Visit Diagnoses     Need for COVID-19 vaccine        Relevant Orders    Covid-19 Moderna Monovalent Fall 2023 (Completed)          Return for Next scheduled follow-up.  Rebecca Blakely MD

## 2024-03-05 PROBLEM — R05.1 ACUTE COUGH: Status: ACTIVE | Noted: 2021-03-18

## 2024-03-06 NOTE — ASSESSMENT & PLAN NOTE
Post viral cough  Clear lungs on exam  Normal vital signs  Sent tessalon perles  Call if worsening or fails to improve

## 2024-03-06 NOTE — ASSESSMENT & PLAN NOTE
Following with breast surgery and dermatology  Most recent biopsy by dermatology is pending  Will have repeat mammogram and breast surgery visit next month

## 2024-03-06 NOTE — ASSESSMENT & PLAN NOTE
Desires to quit  Will pursue inpatient treatment options when he has completed follow up for breast mass- mid April  Encouraged him to continue to try to cut back on alcohol use but discouraged complete cold turkey cessation without medical monitoring  I encouraged him to continue vitamin supplementation- b12, thiamine and folate

## 2024-03-15 ENCOUNTER — APPOINTMENT (RX ONLY)
Dept: URBAN - METROPOLITAN AREA CLINIC 28 | Facility: CLINIC | Age: 59
Setting detail: DERMATOLOGY
End: 2024-03-15

## 2024-03-15 DIAGNOSIS — M79.3 PANNICULITIS, UNSPECIFIED: ICD-10-CM | Status: INADEQUATELY CONTROLLED

## 2024-03-15 DIAGNOSIS — Z48.02 ENCOUNTER FOR REMOVAL OF SUTURES: ICD-10-CM

## 2024-03-15 PROCEDURE — ? COUNSELING

## 2024-03-15 PROCEDURE — ? SUTURE REMOVAL

## 2024-03-15 PROCEDURE — 99212 OFFICE O/P EST SF 10 MIN: CPT

## 2024-03-15 PROCEDURE — ? PATHOLOGY DISCUSSION

## 2024-03-15 PROCEDURE — ? PHOTO-DOCUMENTATION

## 2024-03-15 ASSESSMENT — LOCATION SIMPLE DESCRIPTION DERM
LOCATION SIMPLE: CHEST
LOCATION SIMPLE: CHEST

## 2024-03-15 ASSESSMENT — LOCATION DETAILED DESCRIPTION DERM
LOCATION DETAILED: LEFT LATERAL INFERIOR CHEST
LOCATION DETAILED: LEFT LATERAL INFERIOR CHEST

## 2024-03-15 ASSESSMENT — LOCATION ZONE DERM
LOCATION ZONE: TRUNK
LOCATION ZONE: TRUNK

## 2024-04-16 PROBLEM — M79.3 PANNICULITIS: Status: ACTIVE | Noted: 2024-04-16

## 2024-04-17 ENCOUNTER — OFFICE VISIT (OUTPATIENT)
Dept: SURGERY | Facility: CLINIC | Age: 59
End: 2024-04-17
Payer: COMMERCIAL

## 2024-04-17 ENCOUNTER — HOSPITAL ENCOUNTER (OUTPATIENT)
Dept: RADIOLOGY | Facility: HOSPITAL | Age: 59
Discharge: HOME | End: 2024-04-17
Attending: SURGERY
Payer: COMMERCIAL

## 2024-04-17 ENCOUNTER — APPOINTMENT (OUTPATIENT)
Dept: LAB | Facility: HOSPITAL | Age: 59
End: 2024-04-17
Attending: FAMILY MEDICINE
Payer: COMMERCIAL

## 2024-04-17 ENCOUNTER — OFFICE VISIT (OUTPATIENT)
Dept: INTERNAL MEDICINE | Facility: HOSPITAL | Age: 59
End: 2024-04-17
Payer: COMMERCIAL

## 2024-04-17 VITALS
RESPIRATION RATE: 17 BRPM | HEIGHT: 66 IN | SYSTOLIC BLOOD PRESSURE: 125 MMHG | BODY MASS INDEX: 22.23 KG/M2 | TEMPERATURE: 99.7 F | OXYGEN SATURATION: 100 % | HEART RATE: 78 BPM | DIASTOLIC BLOOD PRESSURE: 72 MMHG | WEIGHT: 138.3 LBS

## 2024-04-17 VITALS
TEMPERATURE: 98.3 F | RESPIRATION RATE: 16 BRPM | OXYGEN SATURATION: 99 % | HEART RATE: 66 BPM | DIASTOLIC BLOOD PRESSURE: 87 MMHG | SYSTOLIC BLOOD PRESSURE: 135 MMHG

## 2024-04-17 DIAGNOSIS — N63.21 MASS OF UPPER OUTER QUADRANT OF LEFT BREAST: Primary | ICD-10-CM

## 2024-04-17 DIAGNOSIS — E53.8 B12 DEFICIENCY: ICD-10-CM

## 2024-04-17 DIAGNOSIS — D70.8 OTHER NEUTROPENIA (CMS/HCC): ICD-10-CM

## 2024-04-17 DIAGNOSIS — F10.29 ALCOHOL DEPENDENCE WITH UNSPECIFIED ALCOHOL-INDUCED DISORDER: ICD-10-CM

## 2024-04-17 DIAGNOSIS — F10.10 ALCOHOL ABUSE: ICD-10-CM

## 2024-04-17 DIAGNOSIS — Z13.220 SCREENING CHOLESTEROL LEVEL: ICD-10-CM

## 2024-04-17 DIAGNOSIS — N63.21 MASS OF UPPER OUTER QUADRANT OF LEFT BREAST: ICD-10-CM

## 2024-04-17 DIAGNOSIS — E53.8 DEFICIENCY OF OTHER SPECIFIED B GROUP VITAMINS: ICD-10-CM

## 2024-04-17 DIAGNOSIS — M79.3 PANNICULITIS: Primary | ICD-10-CM

## 2024-04-17 DIAGNOSIS — M79.3 PANNICULITIS, UNSPECIFIED: ICD-10-CM

## 2024-04-17 PROCEDURE — G0279 TOMOSYNTHESIS, MAMMO: HCPCS | Mod: LT

## 2024-04-17 PROCEDURE — 76642 ULTRASOUND BREAST LIMITED: CPT | Mod: LT

## 2024-04-17 PROCEDURE — 99213 OFFICE O/P EST LOW 20 MIN: CPT | Performed by: SURGERY

## 2024-04-17 PROCEDURE — 99214 OFFICE O/P EST MOD 30 MIN: CPT | Performed by: FAMILY MEDICINE

## 2024-04-17 PROCEDURE — 3008F BODY MASS INDEX DOCD: CPT | Performed by: FAMILY MEDICINE

## 2024-04-17 RX ORDER — LANOLIN ALCOHOL/MO/W.PET/CERES
100 CREAM (GRAM) TOPICAL DAILY
Qty: 90 TABLET | Refills: 3 | Status: SHIPPED | OUTPATIENT
Start: 2024-04-17 | End: 2025-04-17

## 2024-04-17 RX ORDER — LANOLIN ALCOHOL/MO/W.PET/CERES
1000 CREAM (GRAM) TOPICAL DAILY
Qty: 90 TABLET | Refills: 3 | Status: SHIPPED | OUTPATIENT
Start: 2024-04-17 | End: 2025-04-17

## 2024-04-17 RX ORDER — FOLIC ACID 1 MG/1
1 TABLET ORAL DAILY
Qty: 90 TABLET | Refills: 3 | Status: SHIPPED | OUTPATIENT
Start: 2024-04-17 | End: 2025-04-12

## 2024-04-17 ASSESSMENT — PAIN SCALES - GENERAL
PAINLEVEL: 0-NO PAIN
PAINLEVEL: 0-NO PAIN

## 2024-04-17 NOTE — ASSESSMENT & PLAN NOTE
He is still open to an inpatient treatment stay  We will get repeat labs, abdominal imaging and have him see his neurologist and then hopefully this could happen in June, 2024  I encouraged him again to try to cut back on his alcohol intake but not stop cold turkey

## 2024-04-17 NOTE — LETTER
April 24, 2024     Rebecca Blakely MD  [address]  [address]  [place] [address]    Patient: Rickie Kaminski  YOB: 1965  Date of Visit: 4/17/2024      Dear Dr. Blakely:    Thank you for referring Rickie Kaminski to me for evaluation. Below are my notes for this consultation.    If you have questions, please do not hesitate to call me. I look forward to following your patient along with you.         Sincerely,        Joe Ferrell,         CC: NYDIA Murcia Robin M, DO  4/24/2024  1:54 PM  Sign when Signing Visit  Rickie returns to the office for follow-up.  He continues to have a palpable mass in his left breast which has now been biopsied multiple times.  The most recent biopsy which included a skin biopsy showed findings compatible with pancreatic panniculitis.  He had a mammogram and ultrasound performed which showed slightly increased calcifications within the mass but they were not overly suspicious.  Ultrasound showed a stable increased area of echogenic tissue but no discrete mass with overlying skin thickening.  He continues to have a few thickened left axillary nodes but overall these are stable.  A 6-month follow-up mammogram was recommended.  I personally reviewed the images and agree with the recommendation.    Physical exam: He continues to have an ill-defined mass in the left breast 1:00 radian which is just underneath the skin.  Overall this is stable.  There are some changes in the skin from the recent punch biopsy but overall this is healing well.  There is no supraclavicular or axillary adenopathy bilaterally.    Impression: Left breast mass which has had benign findings on multiple biopsies.    Plan: I assured Rickie and his wife my concern for malignancy is extremely low given his stable exam and imaging findings as well as the multiple reassuring biopsies.  I have no explanation for the finding of the pancreatic panniculitis but he is following  up with his primary care team and will likely see GI.  His wife notes they are planning on getting a CAT scan or an MRI.  As a precaution we will plan to repeat the MRI in 6 months.  If this is reassuring I do not feel he needs any additional breast imaging unless there is a change in his exam going forward.

## 2024-04-17 NOTE — Clinical Note
I cannot explain the results of the recent biopsy but I am comfortable that this is NOT a breast cancer.  I ordered the 6-month follow-up mammogram.  I can see him back as needed.  I have certainly never gotten that result from a breast biopsy.

## 2024-04-17 NOTE — ASSESSMENT & PLAN NOTE
Biopsy shows panniculitis, inflammation and scarring of the subcutaneous fat  This can be associated with pancreatic disease  He denies any abdominal pain  He does have alcohol use disorder  Dr Head recommended abdominal imaging and lab evaluation, I ordered both

## 2024-04-17 NOTE — PROGRESS NOTES
SUBJECTIVE    Rickie Kaminski is a 58 y.o. male who has a history of alcohol use disorder, cognitive impairment, b12 deficiency, fatty liver, breast mass who presents for routine follow up    He comes today as usual with Ina, his wife and primary caregiver     He has a left breast mass/lesion- had a birads 4 mammogram 10/20/23  Biopsy by Dr Kowalski 11/07/23- path was benign - fat fibrosis  He followed up with Dr Maki at my request and is also seeing Dr Arriaza, dermatology  Dr Arriaza completed another biopsy, path revealed panniculitis  Dr Arriaza reached out to me with concern for an underlying pancreatic issue given the biopsy results and recommended abdominal imaging and blood work  He also had a repeat mammogram and US earlier today, met with Dr Maki who advised no change on imaging and continue to monitor    He is drinking three 24 ounce cans 8% malt liquor daily  No history of withdrawal seizures, no history of alcohol withdrawal hospitalization  We have been working on getting him inpatient treatment, he is still open to doing that after completion of health evaluations    He has a history of cognitive decline with memory impairment preventing him from working  He saw neurologist at [place], Dr Stroud, in Oct, 2021  His MRI brain showed cortical atrophy  Dr Stroud advised his cognitive difficulty was a result of his long term heavy alcohol use and advised he should stop drinking alcohol   He has a follow up with Dr Stroud scheduled for May 31st    UTD flu shot and covid booster    Wt Readings from Last 15 Encounters:   04/17/24 62.7 kg (138 lb 4.8 oz)   03/04/24 59.7 kg (131 lb 11.2 oz)   01/17/24 63.6 kg (140 lb 3.2 oz)   11/28/23 62.6 kg (138 lb)   11/07/23 63 kg (138 lb 12.8 oz)   10/24/23 59 kg (130 lb)   10/20/23 60.2 kg (132 lb 12.8 oz)   06/13/23 62.1 kg (137 lb)   02/08/23 67 kg (147 lb 12.8 oz)   10/04/22 67.8 kg (149 lb 6.4 oz)   05/04/22 70.9 kg (156 lb 3.2 oz)   01/04/22 65.8 kg (145 lb)  "  10/01/21 73.9 kg (163 lb)   07/19/21 79.4 kg (175 lb)   06/30/21 73.5 kg (162 lb)     Past Medical History:   Diagnosis Date    Alcoholism (CMS/[facility])     Epilepsy (CMS/[facility])     Memory loss      Social History     Tobacco Use    Smoking status: Never     Passive exposure: Never    Smokeless tobacco: Never   Vaping Use    Vaping Use: Never used   Substance Use Topics    Alcohol use: Yes     Comment: alcoholic    Drug use: Yes     Types: Marijuana     Current Medications:   Current Outpatient Medications:     cyanocobalamin (VITAMIN B12) 1,000 mcg tablet, Take 1 tablet (1,000 mcg total) by mouth daily., Disp: 90 tablet, Rfl: 3    folic acid (FOLVITE) 1 mg tablet, Take 1 tablet (1 mg total) by mouth daily., Disp: 90 tablet, Rfl: 3    therapeutic multivitamin (THERAGRAN) tablet, Take 1 tablet by mouth daily., Disp: 90 tablet, Rfl: 3    thiamine 100 mg tablet, Take 1 tablet (100 mg total) by mouth daily., Disp: 90 tablet, Rfl: 3    dorzolamide-timoloL (COSOPT) 22.3-6.8 mg/mL ophthalmic solution, instill 1 drop in both eyes 2x daily, Disp: , Rfl:     mirtazapine (REMERON) 7.5 mg tablet, Take 1 tablet (7.5 mg total) by mouth nightly., Disp: 90 tablet, Rfl: 3    triamcinolone (KENALOG) 0.1 % cream, Apply topically 2 (two) times a day., Disp: 30 g, Rfl: 1    Allergies: Shellfish containing products, Crab, and Shellfish derived    Objective   Physical Exam:  Visit Vitals  /72 (BP Location: Right upper arm, Patient Position: Sitting)   Pulse 78   Temp 37.6 °C (99.7 °F) (Temporal)   Resp 17   Ht 1.676 m (5' 6\")   Wt 62.7 kg (138 lb 4.8 oz)   SpO2 100%   BMI 22.32 kg/m²      Physical Exam  Vitals reviewed.   Constitutional:       Appearance: Normal appearance.   Cardiovascular:      Rate and Rhythm: Normal rate and regular rhythm.   Pulmonary:      Effort: Pulmonary effort is normal.      Breath sounds: Normal breath sounds. No wheezing or rhonchi.   Abdominal:      Palpations: Abdomen is soft.      Tenderness: There is no " abdominal tenderness.   Skin:     Comments: Firm subcutaneous mass left breast, largely unchanged from prior   Neurological:      Mental Status: He is alert and oriented to person, place, and time.   Psychiatric:         Mood and Affect: Mood normal.         Behavior: Behavior normal.         Thought Content: Thought content normal.         Judgment: Judgment normal.         Lab Results   Component Value Date    CHOL 182 09/26/2022    CHOL 196 12/07/2020    CHOL 196 08/12/2017     Lab Results   Component Value Date    HDL 93 09/26/2022    HDL 89 12/07/2020    HDL 87 08/12/2017     Lab Results   Component Value Date    LDLCALC 74 09/26/2022    LDLCALC 91 12/07/2020    LDLCALC 85 08/12/2017     Lab Results   Component Value Date    TRIG 86 09/26/2022    TRIG 93 12/07/2020    TRIG 122 08/12/2017     The 10-year ASCVD risk score (Vicki GUAMAN, et al., 2019) is: 10.5%    Values used to calculate the score:      Age: 58 years      Sex: Male      Is Non- : Yes      Diabetic: No      Tobacco smoker: No      Systolic Blood Pressure: 125 mmHg      Is BP treated: Yes      HDL Cholesterol: 93 mg/dL      Total Cholesterol: 182 mg/dL    Assessment & Plan:  Problem List Items Addressed This Visit          Unprioritized    Alcohol dependence (CMS/HCC)     He is still open to an inpatient treatment stay  We will get repeat labs, abdominal imaging and have him see his neurologist and then hopefully this could happen in June, 2024  I encouraged him again to try to cut back on his alcohol intake but not stop cold turkey         Relevant Medications    thiamine 100 mg tablet    folic acid (FOLVITE) 1 mg tablet    therapeutic multivitamin (THERAGRAN) tablet    Other Relevant Orders    CBC and differential    Vitamin B6, Plasma    Folate    Vitamin B1 (Thiamine), Blood    B12 deficiency     Continue oral supplmentation and recheck b12 level         Relevant Medications    cyanocobalamin (VITAMIN B12) 1,000 mcg tablet     Other Relevant Orders    Vitamin B12    CBC and differential    Other neutropenia (CMS/HCC)     Isolated on last labs  Recheck cbc and vitamin studies         Relevant Medications    cyanocobalamin (VITAMIN B12) 1,000 mcg tablet    folic acid (FOLVITE) 1 mg tablet    Other Relevant Orders    CBC and differential    Mass of upper outer quadrant of left breast     Biopsy shows panniculitis, inflammation and scarring of the subcutaneous fat  This can be associated with pancreatic disease  He denies any abdominal pain  He does have alcohol use disorder  Dr Head recommended abdominal imaging and lab evaluation, I ordered both         Panniculitis - Primary    Relevant Orders    CT ABDOMEN PELVIS WITH AND WITHOUT IV CONTRAST    Comprehensive metabolic panel    Lipase    Amylase     Other Visit Diagnoses       Screening cholesterol level        Relevant Orders    Lipid panel    Alcohol abuse        Relevant Medications    thiamine 100 mg tablet    folic acid (FOLVITE) 1 mg tablet    therapeutic multivitamin (THERAGRAN) tablet            Return in about 3 months (around 7/17/2024).  Rebecca Blakely MD

## 2024-04-18 LAB
ALBUMIN SERPL-MCNC: 3.5 G/DL (ref 3.8–4.9)
ALBUMIN/GLOB SERPL: 0.9 {RATIO} (ref 1.2–2.2)
ALP SERPL-CCNC: 76 IU/L (ref 44–121)
ALT SERPL-CCNC: 11 IU/L (ref 0–44)
AMYLASE SERPL-CCNC: 162 U/L (ref 31–110)
AST SERPL-CCNC: 26 IU/L (ref 0–40)
BASOPHILS # BLD AUTO: 0 X10E3/UL (ref 0–0.2)
BASOPHILS NFR BLD AUTO: 0 %
BILIRUB SERPL-MCNC: 0.7 MG/DL (ref 0–1.2)
BUN SERPL-MCNC: 6 MG/DL (ref 8–27)
BUN/CREAT SERPL: 7 (ref 10–24)
CALCIUM SERPL-MCNC: 8.6 MG/DL (ref 8.6–10.2)
CHLORIDE SERPL-SCNC: 102 MMOL/L (ref 96–106)
CHOLEST SERPL-MCNC: 158 MG/DL (ref 100–199)
CO2 SERPL-SCNC: 24 MMOL/L (ref 20–29)
CREAT SERPL-MCNC: 0.84 MG/DL (ref 0.76–1.27)
EGFRCR SERPLBLD CKD-EPI 2021: 100 ML/MIN/1.73
EOSINOPHIL # BLD AUTO: 0 X10E3/UL (ref 0–0.4)
EOSINOPHIL NFR BLD AUTO: 1 %
ERYTHROCYTE [DISTWIDTH] IN BLOOD BY AUTOMATED COUNT: 18.1 % (ref 11.6–15.4)
FOLATE SERPL-MCNC: 7.9 NG/ML
GLOBULIN SER CALC-MCNC: 3.9 G/DL (ref 1.5–4.5)
GLUCOSE SERPL-MCNC: 100 MG/DL (ref 70–99)
HCT VFR BLD AUTO: 49.2 % (ref 37.5–51)
HDLC SERPL-MCNC: 112 MG/DL
HGB BLD-MCNC: 15.5 G/DL (ref 13–17.7)
IMM GRANULOCYTES # BLD AUTO: 0 X10E3/UL (ref 0–0.1)
IMM GRANULOCYTES NFR BLD AUTO: 0 %
LDLC SERPL CALC-MCNC: 30 MG/DL (ref 0–99)
LIPASE SERPL-CCNC: 54 U/L (ref 13–78)
LYMPHOCYTES # BLD AUTO: 1.1 X10E3/UL (ref 0.7–3.1)
LYMPHOCYTES NFR BLD AUTO: 32 %
MCH RBC QN AUTO: 24.6 PG (ref 26.6–33)
MCHC RBC AUTO-ENTMCNC: 31.5 G/DL (ref 31.5–35.7)
MCV RBC AUTO: 78 FL (ref 79–97)
MONOCYTES # BLD AUTO: 0.4 X10E3/UL (ref 0.1–0.9)
MONOCYTES NFR BLD AUTO: 10 %
NEUTROPHILS # BLD AUTO: 2.1 X10E3/UL (ref 1.4–7)
NEUTROPHILS NFR BLD AUTO: 57 %
PLATELET # BLD AUTO: 223 X10E3/UL (ref 150–450)
POTASSIUM SERPL-SCNC: 3.9 MMOL/L (ref 3.5–5.2)
PROT SERPL-MCNC: 7.4 G/DL (ref 6–8.5)
RBC # BLD AUTO: 6.31 X10E6/UL (ref 4.14–5.8)
SODIUM SERPL-SCNC: 139 MMOL/L (ref 134–144)
TRIGL SERPL-MCNC: 89 MG/DL (ref 0–149)
VIT B12 SERPL-MCNC: 327 PG/ML (ref 232–1245)
VLDLC SERPL CALC-MCNC: 16 MG/DL (ref 5–40)
WBC # BLD AUTO: 3.6 X10E3/UL (ref 3.4–10.8)

## 2024-04-20 LAB — VIT B1 BLD-SCNC: 77.5 NMOL/L (ref 66.5–200)

## 2024-04-22 LAB — PYRIDOXAL PHOS SERPL-MCNC: 5.8 UG/L (ref 3.4–65.2)

## 2024-04-23 ENCOUNTER — TELEPHONE (OUTPATIENT)
Dept: INTERNAL MEDICINE | Facility: HOSPITAL | Age: 59
End: 2024-04-23
Payer: COMMERCIAL

## 2024-04-24 ENCOUNTER — TELEPHONE (OUTPATIENT)
Dept: INTERNAL MEDICINE | Facility: HOSPITAL | Age: 59
End: 2024-04-24
Payer: COMMERCIAL

## 2024-04-24 ENCOUNTER — TELEPHONE (OUTPATIENT)
Dept: SCHEDULING | Age: 59
End: 2024-04-24
Payer: COMMERCIAL

## 2024-04-24 NOTE — TELEPHONE ENCOUNTER
CT Abdomen/Pelvis:  Pending    Tracking#[id number]    This case is in peer to peer status, please call insurance company to discuss the case.    [employer])#[phone]

## 2024-04-24 NOTE — TELEPHONE ENCOUNTER
I called to complete peer to peer    Reference number for the call: [id number]    physician reviewer requested my most recent office note, note from dermatologist and last lab work faxed to them for review of the case.     Fax - [phone]    The case was denied for lack of lab work    Kirstie can you fax the last derm note in media tab from Dr Head, my note and last blood work completed on 4/18/24 to the above fax number?    Thanks!

## 2024-04-24 NOTE — PROGRESS NOTES
Rickie returns to the office for follow-up.  He continues to have a palpable mass in his left breast which has now been biopsied multiple times.  The most recent biopsy which included a skin biopsy showed findings compatible with pancreatic panniculitis.  He had a mammogram and ultrasound performed which showed slightly increased calcifications within the mass but they were not overly suspicious.  Ultrasound showed a stable increased area of echogenic tissue but no discrete mass with overlying skin thickening.  He continues to have a few thickened left axillary nodes but overall these are stable.  A 6-month follow-up mammogram was recommended.  I personally reviewed the images and agree with the recommendation.    Physical exam: He continues to have an ill-defined mass in the left breast 1:00 radian which is just underneath the skin.  Overall this is stable.  There are some changes in the skin from the recent punch biopsy but overall this is healing well.  There is no supraclavicular or axillary adenopathy bilaterally.    Impression: Left breast mass which has had benign findings on multiple biopsies.    Plan: I assured Rickie and his wife my concern for malignancy is extremely low given his stable exam and imaging findings as well as the multiple reassuring biopsies.  I have no explanation for the finding of the pancreatic panniculitis but he is following up with his primary care team and will likely see GI.  His wife notes they are planning on getting a CAT scan or an MRI.  As a precaution we will plan to repeat the MRI in 6 months.  If this is reassuring I do not feel he needs any additional breast imaging unless there is a change in his exam going forward.

## 2024-04-24 NOTE — TELEPHONE ENCOUNTER
After submission of a pre-certification to N/A , it has been denied and requiring Peer Review .    Ordering Provider: Susana Pereira MD  Test ordered: CT Abd/Pel W/IV Contrast   Date ordered: 2024  Contact information for Peer Review [employer] Reference Number: [id number]   Reason for denial:   Exam Request Verification: Detail  Upload Clinical Document  Print Fax Cover Sheet  Member   Name: KEVIN PARKER   Gender: Male   YOB: 1965   Member ID: [id number]   Health Plan: [id number] [facility] [place]   Spoken Language: ENGLISH   Written Language: ENGLISH   Referring Physician   Name: SUSANA PEREIRA   Address: [address] [place] [address]   Phone: [phone]   Tax ID: [id number]   UPIN:    Specialty: General/Family Practice   Rendering Provider   Name: [facility]   Address: [address] [place] [address]   Phone: [phone]   Tax ID: [id number]              Case   Case Description: Abdomen and Pelvis CT Request ID:  Tracking: Not Available  [id number]         Request Date: 2024 07:44 AM Status: Disapproval   Entry Method: RadMD Validity Dates: [Not Applicable]   ICD10: M79.3 Contact Name: Keli Tobias[place]  (Referring Provider)   Initial Determination Date: 2024 03:55 AM     Final Determination Date: 2024 03:55 AM       Please be advised that all data was current as of 2024 at 8:14 AM [facility]   Radiology   Date of Service: 2024     Expedited: No   Extension: No   CPT4: 15872 Billable Codes   Clinical Rcvd: 2024 - Peer to Peer discussion successful                Denial Rationale   We reviewed the medical information given by your doctor. Your doctor’s records say you have a belly problem. With what was received from your doctor, a decision was made that this is not a reason for a(n) Abdomen and Pelvis CT. To approve, we need results of other tests that were done first  (such as blood and urine). We also need results of initial imaging (like sound wave (ultrasound), x-ray with or without dye (barium) or scope tests). Results of those tests should show why this test is still needed. It is our medical view that the Abdomen and Pelvis CT be denied as it is not medically necessary. [employer] Clinical Guideline 068 for Abdomen Pelvis CT was used in this request.    Medical Necessity Evaluation   Question Answer   Please confirm that this is a request for a Diagnostic CT and NOT a CT Needle Guidance (28559, 69327 or 91266)? Yes this is a request for a Diagnostic CT   Is this a request for an Abdomen and Pelvis CT? This is a request for an Abdomen and Pelvis CT.   Is this study being requested for abdominal and/or pelvic pain? This study is being requested for abdominal and/or pelvic pain.   Which of the following best describes the duration of pain? The study is being ordered for chronic pain.   Is this the first visit for this complaint? This is not the first visit for this complaint.   Has there been a physical exam? There has been a physical exam.   Is the patient male or female? The patient is male.   Was a rectal exam performed? A rectal exam was not performed.      Status History   Date Status   04/24/2024 03:55AM Your request was not approved. Please refer to the letter we sent you via email, fax or USPS for next steps.   04/23/2024 01:21PM This case cannot be approved based on clinical information received. Please upload additional clinical documents if available.   04/23/2024 12:55PM This request is being reviewed by our physicians   04/23/2024 12:26PM This request is being reviewed by our clinicians   04/23/2024 12:26PM Your clinical documentation has been received and is being reviewed by our clinical staff. You will be notifed of the determination.   04/22/2024 07:44AM Please fax or upload the clinical documentation requested in the fax or email we sent to you.   Documents  Received   Date Received Name Size Method Actions      Documents Sent   Date Sent Pages Recipient Actions          Once Peer Review complete, please respond with auth number if applicable.

## 2024-04-25 ENCOUNTER — TELEPHONE (OUTPATIENT)
Dept: INTERNAL MEDICINE | Facility: HOSPITAL | Age: 59
End: 2024-04-25
Payer: COMMERCIAL

## 2024-04-25 NOTE — TELEPHONE ENCOUNTER
I called for a peer to peer  Call confirmation #[id number]    I received authorization for the CT scan abdomen pelvis    It was quite difficult to hear the automated authorization number but the number I here is off #[id number]      Kirstie can you confirm the auth number is correct and help the patient schedule this?      Thanks!

## 2024-04-25 NOTE — TELEPHONE ENCOUNTER
CT Abdomen/Pelvis: Auth#[id number]     Eff:  04/22/2024 - 06/21/2024    Spoke to Ina(pt's wife), schedule number reviewed and told her to let radiology know to follow the pancreas protocol.

## 2024-05-14 ENCOUNTER — HOSPITAL ENCOUNTER (OUTPATIENT)
Dept: RADIOLOGY | Facility: HOSPITAL | Age: 59
Discharge: HOME | End: 2024-05-14
Attending: FAMILY MEDICINE
Payer: COMMERCIAL

## 2024-05-14 DIAGNOSIS — M79.3 PANNICULITIS: ICD-10-CM

## 2024-05-14 PROCEDURE — 74178 CT ABD&PLV WO CNTR FLWD CNTR: CPT

## 2024-05-14 PROCEDURE — 63600105 HC IODINE BASED CONTRAST: Mod: JZ | Performed by: FAMILY MEDICINE

## 2024-05-14 RX ORDER — IOPAMIDOL 755 MG/ML
100 INJECTION, SOLUTION INTRAVASCULAR
Status: COMPLETED | OUTPATIENT
Start: 2024-05-14 | End: 2024-05-14

## 2024-05-14 RX ADMIN — IOPAMIDOL 100 ML: 755 INJECTION, SOLUTION INTRAVENOUS at 13:55

## 2024-05-14 NOTE — PROGRESS NOTES
"Rickie Kaminski   [id number]    Your doctor has referred you for a CT examination that requires the injection of an iodinated contrast material into your bloodstream. This iodinated contrast material, sometimes referred to as \"x-ray dye\" allows for better interpretation of the x-ray films or CT images and results in a more accurate interpretation of the examination.     Without the use of iodinated contrast (x-ray dye), the examination may be less informative and result in a suboptimal examination, and possibly a delay in diagnosis and, if needed, treatment.     The iodinated contrast material is given through a small needle or catheter placed into a vein, usually on the inside of the elbow, on the back of hand, or in a vein in the foot or lower leg.    The most common, though still rare, potential reaction to an intravenous contrast injection is an allergic-like reaction. Most allergic-like reactions are mild, though a small subset of people can have more severe reactions. Mild reactions include mild / scattered hives, itching, scratchy throat, sneezing and nasal congestion. More severe reactions include facial swelling, severe difficulty breathing, wheezing and anaphylactic shock. Those with a prior history allergic-like reaction to the same class of contrast media (such as CT contrast or MRI contrast) are at the highest risk for an allergic reaction.     If you believe you had an allergic reaction to contrast in the past, please let our staff know. We can determine if this increases your risk for a future reaction and provide steps to decrease the risk. This may delay your examination, but it decreases the risk of having a new and possibly more severe reaction to the contrast injection.    People with a history of prior allergic reactions to other substances (such as unrelated medications and food) and patients with a history of asthma have slightly increased risk for an allergic reaction from contrast " material when compared with the general population, but do not require to be pretreated prior to a contrast injection.    You should notify the physician, nurse or technologist if you have ever had any of these conditions or if you have any questions.

## 2024-05-21 ENCOUNTER — TELEPHONE (OUTPATIENT)
Dept: INTERNAL MEDICINE | Facility: HOSPITAL | Age: 59
End: 2024-05-21
Payer: COMMERCIAL

## 2024-07-24 ENCOUNTER — OFFICE VISIT (OUTPATIENT)
Dept: INTERNAL MEDICINE | Facility: HOSPITAL | Age: 59
End: 2024-07-24
Payer: COMMERCIAL

## 2024-07-24 VITALS
HEIGHT: 66 IN | TEMPERATURE: 99.5 F | RESPIRATION RATE: 20 BRPM | BODY MASS INDEX: 20.41 KG/M2 | OXYGEN SATURATION: 99 % | SYSTOLIC BLOOD PRESSURE: 140 MMHG | WEIGHT: 127 LBS | DIASTOLIC BLOOD PRESSURE: 85 MMHG | HEART RATE: 86 BPM

## 2024-07-24 DIAGNOSIS — F10.29 ALCOHOL DEPENDENCE WITH UNSPECIFIED ALCOHOL-INDUCED DISORDER: Primary | ICD-10-CM

## 2024-07-24 DIAGNOSIS — G47.33 OBSTRUCTIVE SLEEP APNEA: ICD-10-CM

## 2024-07-24 PROCEDURE — 99214 OFFICE O/P EST MOD 30 MIN: CPT | Performed by: FAMILY MEDICINE

## 2024-07-24 PROCEDURE — 3008F BODY MASS INDEX DOCD: CPT | Performed by: FAMILY MEDICINE

## 2024-07-24 ASSESSMENT — ENCOUNTER SYMPTOMS
WEAKNESS: 0
SEIZURES: 0
FATIGUE: 0
FEVER: 0
DIARRHEA: 0
UNEXPECTED WEIGHT CHANGE: 1
DYSPHORIC MOOD: 0
VOMITING: 0
DIFFICULTY URINATING: 0
NAUSEA: 0
LIGHT-HEADEDNESS: 0
HEADACHES: 0
NERVOUS/ANXIOUS: 0
DIZZINESS: 0
DYSURIA: 0
BLOOD IN STOOL: 0
APPETITE CHANGE: 1
HEMATURIA: 0
ABDOMINAL PAIN: 0
SHORTNESS OF BREATH: 0

## 2024-07-24 ASSESSMENT — PAIN SCALES - GENERAL: PAINLEVEL: 0-NO PAIN

## 2024-07-24 NOTE — PATIENT INSTRUCTIONS
Continue your vitamins      Call [facility] to see if they have beds [phone] [address], [place] [address]

## 2024-07-24 NOTE — PROGRESS NOTES
SUBJECTIVE    Rickie Kaminski is a 58 y.o. male who has a history of alcohol use disorder, cognitive impairment, b12 deficiency, fatty liver, breast mass who presents for routine follow up    He comes today as usual with Ina, his wife and primary caregiver    Today they tell me he is not eating much  He eats a few bites of food and stops eating  He denies early satiety  Denies abdominal pain or blood in his stool    He continues to drink about three 24 ounce cans 8% malt liquor daily  No history of withdrawal seizures, no history of alcohol withdrawal hospitalization  He tells me he is taking his vitamins      UTD flu shot and covid booster    Review of Systems -   Review of Systems   Constitutional:  Positive for appetite change and unexpected weight change. Negative for fatigue and fever.   Respiratory:  Negative for shortness of breath.    Cardiovascular:  Negative for chest pain and leg swelling.   Gastrointestinal:  Negative for abdominal pain, blood in stool, diarrhea, nausea and vomiting.   Genitourinary:  Negative for difficulty urinating, dysuria and hematuria.   Neurological:  Negative for dizziness, seizures, weakness, light-headedness and headaches.   Psychiatric/Behavioral:  Negative for dysphoric mood. The patient is not nervous/anxious.        Wt Readings from Last 15 Encounters:   07/24/24 57.6 kg (127 lb)   04/17/24 62.7 kg (138 lb 4.8 oz)   03/04/24 59.7 kg (131 lb 11.2 oz)   01/17/24 63.6 kg (140 lb 3.2 oz)   11/28/23 62.6 kg (138 lb)   11/07/23 63 kg (138 lb 12.8 oz)   10/24/23 59 kg (130 lb)   10/20/23 60.2 kg (132 lb 12.8 oz)   06/13/23 62.1 kg (137 lb)   02/08/23 67 kg (147 lb 12.8 oz)   10/04/22 67.8 kg (149 lb 6.4 oz)   05/04/22 70.9 kg (156 lb 3.2 oz)   01/04/22 65.8 kg (145 lb)   10/01/21 73.9 kg (163 lb)   07/19/21 79.4 kg (175 lb)     Past Medical History:   Diagnosis Date    Alcoholism (CMS/HCC)     Epilepsy (CMS/HCC)     Memory loss      Social History     Tobacco Use    Smoking  "status: Never     Passive exposure: Never    Smokeless tobacco: Never   Vaping Use    Vaping Use: Never used   Substance Use Topics    Alcohol use: Yes     Comment: alcoholic    Drug use: Yes     Types: Marijuana     Current Medications:   Current Outpatient Medications:     cyanocobalamin (VITAMIN B12) 1,000 mcg tablet, Take 1 tablet (1,000 mcg total) by mouth daily., Disp: 90 tablet, Rfl: 3    dorzolamide-timoloL (COSOPT) 22.3-6.8 mg/mL ophthalmic solution, instill 1 drop in both eyes 2x daily, Disp: , Rfl:     folic acid (FOLVITE) 1 mg tablet, Take 1 tablet (1 mg total) by mouth daily., Disp: 90 tablet, Rfl: 3    therapeutic multivitamin (THERAGRAN) tablet, Take 1 tablet by mouth daily., Disp: 90 tablet, Rfl: 3    thiamine 100 mg tablet, Take 1 tablet (100 mg total) by mouth daily., Disp: 90 tablet, Rfl: 3    triamcinolone (KENALOG) 0.1 % cream, Apply topically 2 (two) times a day., Disp: 30 g, Rfl: 1    Allergies: Crab, Shellfish containing products, and Shellfish derived    Objective   Physical Exam:  Visit Vitals  BP (!) 140/85 (BP Location: Left upper arm, Patient Position: Sitting)   Pulse 86   Temp 37.5 °C (99.5 °F) (Temporal)   Resp 20   Ht 1.676 m (5' 6\")   Wt 57.6 kg (127 lb)   SpO2 99%   BMI 20.50 kg/m²      Physical Exam  Vitals reviewed.   Constitutional:       Appearance: Normal appearance.   Cardiovascular:      Rate and Rhythm: Normal rate and regular rhythm.   Pulmonary:      Effort: Pulmonary effort is normal.      Breath sounds: Normal breath sounds. No wheezing.   Abdominal:      Palpations: Abdomen is soft.      Tenderness: There is no abdominal tenderness.   Neurological:      Mental Status: He is alert and oriented to person, place, and time.   Psychiatric:         Mood and Affect: Mood normal.         Behavior: Behavior normal.         Thought Content: Thought content normal.         Judgment: Judgment normal.           Lab Results   Component Value Date    CHOL 158 04/17/2024    CHOL 182 " 09/26/2022    CHOL 196 12/07/2020     Lab Results   Component Value Date     04/17/2024    HDL 93 09/26/2022    HDL 89 12/07/2020     Lab Results   Component Value Date    LDLCALC 30 04/17/2024    LDLCALC 74 09/26/2022    LDLCALC 91 12/07/2020     Lab Results   Component Value Date    TRIG 89 04/17/2024    TRIG 86 09/26/2022    TRIG 93 12/07/2020       Assessment & Plan:  Problem List Items Addressed This Visit          Unprioritized    Alcohol dependence (CMS/HCC) - Primary     He is still open to inpatient treatment and we discussed this again today  He is losing weight with low appetite  I suspect this is due to alcohol use disorder  We had been waiting until some health issues resolved  At this point he is medically clear to do inpatient treatment  Plan is for him to call [facility] to see if they have bed openings and then walk in for intake  Wife and patient are on board with this plan and will present for treatment in the next week or so         Obstructive sleep apnea     Untreated, I recommended he follow up with sleep medicine and gave him a referral         Relevant Orders    Ambulatory referral to Sleep Medicine         Return in about 4 months (around 11/24/2024).  Rebecca Blakely MD

## 2024-07-26 NOTE — ASSESSMENT & PLAN NOTE
He is still open to inpatient treatment and we discussed this again today  He is losing weight with low appetite  I suspect this is due to alcohol use disorder  We had been waiting until some health issues resolved  At this point he is medically clear to do inpatient treatment  Plan is for him to call [facility] to see if they have bed openings and then walk in for intake  Wife and patient are on board with this plan and will present for treatment in the next week or so

## 2024-08-30 ENCOUNTER — HOSPITAL ENCOUNTER (EMERGENCY)
Facility: HOSPITAL | Age: 59
Discharge: HOME | End: 2024-08-30
Attending: EMERGENCY MEDICINE
Payer: COMMERCIAL

## 2024-08-30 ENCOUNTER — APPOINTMENT (EMERGENCY)
Dept: RADIOLOGY | Facility: HOSPITAL | Age: 59
End: 2024-08-30
Payer: COMMERCIAL

## 2024-08-30 VITALS
WEIGHT: 160 LBS | HEART RATE: 73 BPM | RESPIRATION RATE: 16 BRPM | DIASTOLIC BLOOD PRESSURE: 86 MMHG | HEIGHT: 67 IN | BODY MASS INDEX: 25.11 KG/M2 | OXYGEN SATURATION: 99 % | SYSTOLIC BLOOD PRESSURE: 152 MMHG | TEMPERATURE: 98 F

## 2024-08-30 DIAGNOSIS — M10.9 ACUTE GOUT OF RIGHT HAND, UNSPECIFIED CAUSE: Primary | ICD-10-CM

## 2024-08-30 LAB
ANION GAP SERPL CALC-SCNC: 7 MEQ/L (ref 3–15)
BASOPHILS # BLD: 0.01 K/UL (ref 0.01–0.1)
BASOPHILS NFR BLD: 0.3 %
BUN SERPL-MCNC: 7 MG/DL (ref 7–25)
CALCIUM SERPL-MCNC: 8.5 MG/DL (ref 8.6–10.3)
CHLORIDE SERPL-SCNC: 106 MEQ/L (ref 98–107)
CO2 SERPL-SCNC: 26 MEQ/L (ref 21–31)
CREAT SERPL-MCNC: 0.7 MG/DL (ref 0.7–1.3)
DIFFERENTIAL METHOD BLD: ABNORMAL
EGFRCR SERPLBLD CKD-EPI 2021: >60 ML/MIN/1.73M*2
EOSINOPHIL # BLD: 0.05 K/UL (ref 0.04–0.54)
EOSINOPHIL NFR BLD: 1.7 %
ERYTHROCYTE [DISTWIDTH] IN BLOOD BY AUTOMATED COUNT: 16.6 % (ref 11.6–14.4)
GLUCOSE SERPL-MCNC: 73 MG/DL (ref 70–99)
HCT VFR BLD AUTO: 41.1 % (ref 40.1–51)
HGB BLD-MCNC: 13.2 G/DL (ref 13.7–17.5)
IMM GRANULOCYTES # BLD AUTO: 0 K/UL (ref 0–0.08)
IMM GRANULOCYTES NFR BLD AUTO: 0 %
LYMPHOCYTES # BLD: 1.06 K/UL (ref 1.2–3.5)
LYMPHOCYTES NFR BLD: 35.1 %
MCH RBC QN AUTO: 25.4 PG (ref 28–33.2)
MCHC RBC AUTO-ENTMCNC: 32.1 G/DL (ref 32.2–36.5)
MCV RBC AUTO: 79 FL (ref 83–98)
MONOCYTES # BLD: 0.38 K/UL (ref 0.3–1)
MONOCYTES NFR BLD: 12.6 %
NEUTROPHILS # BLD: 1.52 K/UL (ref 1.7–7)
NEUTS SEG NFR BLD: 50.3 %
NRBC BLD-RTO: 0 %
OVALOCYTES BLD QL SMEAR: ABNORMAL
PDW BLD AUTO: 9.8 FL (ref 9.4–12.4)
PLAT MORPH BLD: NORMAL
PLATELET # BLD AUTO: 252 K/UL (ref 150–350)
PLATELET # BLD EST: ABNORMAL 10*3/UL
POLYCHROMASIA BLD QL SMEAR: ABNORMAL
POTASSIUM SERPL-SCNC: 3.7 MEQ/L (ref 3.5–5.1)
RBC # BLD AUTO: 5.2 M/UL (ref 4.5–5.8)
SODIUM SERPL-SCNC: 139 MEQ/L (ref 136–145)
URATE SERPL-MCNC: 8.8 MG/DL (ref 4.4–7.6)
WBC # BLD AUTO: 3.02 K/UL (ref 3.8–10.5)

## 2024-08-30 PROCEDURE — 99283 EMERGENCY DEPT VISIT LOW MDM: CPT | Mod: U5

## 2024-08-30 PROCEDURE — 63700000 HC SELF-ADMINISTRABLE DRUG

## 2024-08-30 PROCEDURE — 82947 ASSAY GLUCOSE BLOOD QUANT: CPT

## 2024-08-30 PROCEDURE — 85025 COMPLETE CBC W/AUTO DIFF WBC: CPT

## 2024-08-30 PROCEDURE — 84550 ASSAY OF BLOOD/URIC ACID: CPT

## 2024-08-30 PROCEDURE — 73130 X-RAY EXAM OF HAND: CPT | Mod: RT

## 2024-08-30 PROCEDURE — 36415 COLL VENOUS BLD VENIPUNCTURE: CPT

## 2024-08-30 RX ORDER — COLCHICINE 0.6 MG/1
0.6 TABLET ORAL 2 TIMES DAILY
Qty: 10 TABLET | Refills: 0 | Status: SHIPPED | OUTPATIENT
Start: 2024-08-30 | End: 2024-11-29 | Stop reason: SDUPTHER

## 2024-08-30 RX ORDER — COLCHICINE 0.6 MG/1
1.2 TABLET ORAL ONCE
Status: COMPLETED | OUTPATIENT
Start: 2024-08-30 | End: 2024-08-30

## 2024-08-30 RX ADMIN — COLCHICINE 1.2 MG: 0.6 TABLET ORAL at 14:32

## 2024-08-30 NOTE — ED PROVIDER NOTES
Emergency Medicine Note  HPI   HISTORY OF PRESENT ILLNESS     Rickie Kaminski is a 58 y.o. male with PMH of alcoholism, glaucoma, cognitive decline, memory loss, obstructive sleep apnea, reactive depression, weight loss, epilepsy who presents to the ED today via POV for evaluation of right hand pain and swelling since almost 1 week.  Per patient and wife, patient had a fall about 2 weeks previous but they are unsure exactly how it happened.  Patient was intoxicated, fell on his hand after which about 1 week previous, they noted that he was beginning to have increased pain to the right lateral thumb alongside increased swelling.  Wife reports that she thought this was secondary to his fall although was a bit hard that it occurred later therefore she decided to start icing the area.  Reports that she has been icing it for a while and has had no improvement with it.  Reports pain is worse with movements and touch to that region specifically.  They decided to come get evaluated following this.  Denies any recent trauma or injury.  Denies any recent illness.  Patient unsure if he hit his head or passed out but wife states that he did not, has been acting per baseline at home over the past 2 weeks since fall.  Patient specifically denies any fevers, chills, headaches, dizziness, nausea, vomiting, abdominal pain, chest pain, difficulty breathing.        Patient History   PAST HISTORY     Reviewed from Nursing Triage:       Past Medical History:   Diagnosis Date    Alcoholism (CMS/HCC)     Epilepsy (CMS/HCC)     Memory loss        Past Surgical History:   Procedure Laterality Date    BREAST BIOPSY         Family History   Problem Relation Age of Onset    Hypertension Biological Mother     Cancer Biological Mother         Non-Hodgkins Lymphoma    No Known Problems Biological Father     Cancer Biological Sister     No Known Problems Biological Sister     No Known Problems Biological Brother        Social History     Tobacco  Use    Smoking status: Never     Passive exposure: Never    Smokeless tobacco: Never   Vaping Use    Vaping Use: Never used   Substance Use Topics    Alcohol use: Yes     Comment: alcoholic    Drug use: Yes     Types: Marijuana         Review of Systems   REVIEW OF SYSTEMS     See HPI above.        VITALS     ED Vitals      Date/Time Temp Pulse Resp BP SpO2 [facility]   08/30/24 0953 36.7 °C (98 °F) 92 16 112/69 100 % AML                         Physical Exam   PHYSICAL EXAM     PHYSICAL EXAM  GEN: well appearing, well nourished, pleasant male in no acute distress, positioned upright on the stretcher  HEENT: atraumatic, normocephalic, nonicteric sclera   NECK: soft, supple  CV: normal rate and rhythm, no murmurs, rubs, or gallops noted   PULM: normal effort and breathing, no accessory muscle use, CTAB without adventitious breath sounds   ABO: NT, ND, normoactive BSx4, no rebound, rigidity, or guarding  MSK: no cyanosis, clubbing, or edema noted.  Soft tissue swelling noted with minimal erythema, no warmth over the base of the right thumb alongside some extension into the dorsum of the right hand.  Active and passive ROM is intact to the right wrist and fingers with some limitation due to pain to the right thumb but passive range of motion fully intact. Tenderness to palpation over the right lateral thumb near the metacarpal and mid thumb. Neurovascularly intact distally with sensation to light touch and equal and symmetric pulses at radial, PT.  SKIN: warm, dry, and well perfused, no decreased skin turgor, capillary refills <3 secs  NEURO: Alert, awake, answering questions appropriately   PSYCH: normal mood and affect        PROCEDURES     Procedures     DATA     Results       Procedure Component Value Units Date/Time    Basic metabolic panel [[id number]]  (Abnormal) Collected: 08/30/24 1100    Specimen: Blood, Venous Updated: 08/30/24 1159     Sodium 139 mEQ/L      Potassium 3.7 mEQ/L      Comment: Results obtained on  plasma. Plasma Potassium values may be up to 0.4 mEQ/L less than serum values. The differences may be greater for patients with high platelet or white cell counts.        Chloride 106 mEQ/L      CO2 26 mEQ/L      BUN 7 mg/dL      Creatinine 0.7 mg/dL      Glucose 73 mg/dL      Calcium 8.5 mg/dL      eGFR >60.0 mL/min/1.73m*2      Comment: Calculation based on the Chronic Kidney Disease Epidemiology Collaboration (CKD-EPI) equation refit without adjustment for race.        Anion Gap 7 mEQ/L     Uric acid [[id number]]  (Abnormal) Collected: 08/30/24 1100    Specimen: Blood, Venous Updated: 08/30/24 1159     Uric Acid 8.8 mg/dL     CBC and differential [[id number]]  (Abnormal) Collected: 08/30/24 1100    Specimen: Blood, Venous Updated: 08/30/24 1144     WBC 3.02 K/uL      RBC 5.20 M/uL      Hemoglobin 13.2 g/dL      Hematocrit 41.1 %      MCV 79.0 fL      MCH 25.4 pg      MCHC 32.1 g/dL      RDW 16.6 %      Platelets 252 K/uL      MPV 9.8 fL      Differential Type Auto     nRBC 0.0 %      Immature Granulocytes 0.0 %      Neutrophils 50.3 %      Lymphocytes 35.1 %      Monocytes 12.6 %      Eosinophils 1.7 %      Basophils 0.3 %      Immature Granulocytes, Absolute 0.00 K/uL      Neutrophils, Absolute 1.52 K/uL      Lymphocytes, Absolute 1.06 K/uL      Monocytes, Absolute 0.38 K/uL      Eosinophils, Absolute 0.05 K/uL      Basophils, Absolute 0.01 K/uL      PLT Morphology Normal     Platelet Estimate Adequate (150,000-400,000)     Ovalocytes Occasional     Polychromasia Occasional            Imaging Results              X-RAY HAND RIGHT 3+ VIEWS (Final result)  Result time 08/30/24 10:34:03      Final result                   Impression:    IMPRESSION: Please see comment.               Narrative:    CLINICAL HISTORY: swelling   .    COMPARISON: None available.    COMMENT: 4 views of right hand were obtained.    No acute fracture or traumatic malalignment.    There is soft tissue thickening overlying the first  metacarpophalangeal joint  with scattered erosions and bony productive change of the joint itself. There  are also erosive changes along the scaphoid/triscaphe. In combination, findings  suggest an inflammatory arthritis specifically gout. Recommend correlation with  clinical factors to exclude infection/septic arthritis, though less likely given  polyarticular appearance.                                      No orders to display       Scoring tools                                  ED Course & MDM   MDM / ED COURSE / CLINICAL IMPRESSION / DISPO     Medical Decision Making  58 y.o. male with past medical history as above presenting with right thumb and hand pain with swelling ongoing over the course of the past week    Based on history and presentation, differentials include sprain, strain, musculoskeletal pain, fracture, dislocation, arthritis, gout, PPD, low suspicion septic arthritis.    Will obtain plain films alongside basic blood work and uric acid in the ED today.     Patient was offered pain medication but declines.  States if he does not move his thumb it is not very bad.  Notes that he has a history of alcoholism and does not like to take multiple medications secondary to that.    Blood work overall unremarkable with no severe leukocytosis, stable kidney function, uric acid is elevated at 8.8 highly concerning for acute gout flare in the setting of right thumb pain with swelling, x-ray findings.  Lower suspicion for septic arthritis in the presence of full range of motion, no warmth, no fevers or severe leukocytosis.  Has been ongoing over the course of the past week with no further decompensation or spread therefore less likely infectious.  Will treat as gout with colchicine as patient cannot do anti-inflammatories or prednisone due to chronic alcoholism.  Will have him follow-up outpatient with primary care with some concern for maintenance therapy in the setting of daily beer drinking.  Patient and wife  amenable with care plan as discussed.    Of care discussed.  Return precautions discussed.  Patient be discharged.    Consideration for admission but ultimately discharged because: No current indication for admission based on work up and clinical course, need for placement in observation status, or need for immediate surgical/procedural intervention was found during the emergency department visit.    The patient expressed understanding of and agreement with all items discussed and further progression of care plan.    Problems Addressed:  Acute gout of right hand, unspecified cause: acute illness or injury    Amount and/or Complexity of Data Reviewed  Independent Historian: kirsten  Labs: ordered. Decision-making details documented in ED Course.  Radiology: ordered and independent interpretation performed. Decision-making details documented in ED Course.    Risk  Prescription drug management.        ED Course as of 08/30/24 1431   Fri Aug 30, 2024   1028 X-RAY HAND RIGHT 3+ VIEWS  Independent interpretation.  No fracture or dislocation noted but there is some sediment deposits noted near the right thumb with soft tissue swelling.  Significant arthritis and erosions noted [DJ]   1045 X-RAY HAND RIGHT 3+ VIEWS  IMPRESSION:  There is soft tissue thickening overlying the first metacarpophalangeal joint  with scattered erosions and bony productive change of the joint itself. There  are also erosive changes along the scaphoid/triscaphe. In combination, findings  suggest an inflammatory arthritis specifically gout. Recommend correlation with  clinical factors to exclude infection/septic arthritis, though less likely given  polyarticular appearance.   [DJ]   1427 CBC and differential(!)  Mild anemia, no leukocytosis, thrombocytopenia or thrombocytosis [DJ]   1428 Basic metabolic panel(!)  No severe electrolyte derangements or kidney dysfunction [DJ]   1428 Uric Acid(!): 8.8  Elevated [DJ]      ED Course User Index  [DJ] Kulwant  KYLE Rodríguez     Clinical Impression      Acute gout of right hand, unspecified cause     _________________       ED Disposition   Discharge                       Anne Blum PA C  08/30/24 8841

## 2024-08-30 NOTE — DISCHARGE INSTRUCTIONS
You obtain your prescription, you will take colchicine 0.6 mg once today.  This should ideally be 1 hour after you received your first dose in the ED.    For this, you will take colchicine 0.6 mg twice a day for the next 5 days and follow-up with your primary care provider for further long-term management.  They may need to switch her medication, consider a different medication, get you on a maintenance medication to prevent further gout flares.    To follow a low purine diet at home avoiding alcohol, beers, red meat if possible.    Return to the ED for new or worsening symptoms, uncontrolled symptoms.

## 2024-08-30 NOTE — ED ATTESTATION NOTE
The patient was evaluated and managed by the physician assistant / nurse practitioner.       Kwadwo Odonnell MD  08/30/24 0033

## 2024-10-22 ENCOUNTER — TRANSCRIBE ORDERS (OUTPATIENT)
Dept: SCHEDULING | Age: 59
End: 2024-10-22

## 2024-10-22 DIAGNOSIS — N63.21 UNSPECIFIED LUMP IN THE LEFT BREAST, UPPER OUTER QUADRANT: Primary | ICD-10-CM

## 2024-10-31 ENCOUNTER — HOSPITAL ENCOUNTER (OUTPATIENT)
Dept: RADIOLOGY | Facility: HOSPITAL | Age: 59
Discharge: HOME | End: 2024-10-31
Attending: FAMILY MEDICINE
Payer: COMMERCIAL

## 2024-10-31 DIAGNOSIS — N63.21 MASS OF UPPER OUTER QUADRANT OF LEFT BREAST: ICD-10-CM

## 2024-10-31 PROCEDURE — G0279 TOMOSYNTHESIS, MAMMO: HCPCS | Mod: LT

## 2024-11-29 ENCOUNTER — OFFICE VISIT (OUTPATIENT)
Dept: INTERNAL MEDICINE | Facility: HOSPITAL | Age: 59
End: 2024-11-29
Attending: FAMILY MEDICINE
Payer: COMMERCIAL

## 2024-11-29 VITALS
WEIGHT: 139 LBS | DIASTOLIC BLOOD PRESSURE: 68 MMHG | TEMPERATURE: 97.9 F | BODY MASS INDEX: 22.34 KG/M2 | SYSTOLIC BLOOD PRESSURE: 141 MMHG | RESPIRATION RATE: 20 BRPM | OXYGEN SATURATION: 100 % | HEART RATE: 72 BPM | HEIGHT: 66 IN

## 2024-11-29 DIAGNOSIS — F10.29 ALCOHOL DEPENDENCE WITH UNSPECIFIED ALCOHOL-INDUCED DISORDER: ICD-10-CM

## 2024-11-29 DIAGNOSIS — E53.8 B12 DEFICIENCY: ICD-10-CM

## 2024-11-29 DIAGNOSIS — Z23 NEED FOR COVID-19 VACCINE: Primary | ICD-10-CM

## 2024-11-29 PROCEDURE — XW023W7 INTRODUCTION OF COVID-19 VACCINE BOOSTER INTO MUSCLE, PERCUTANEOUS APPROACH, NEW TECHNOLOGY GROUP 7: ICD-10-PCS | Performed by: FAMILY MEDICINE

## 2024-11-29 PROCEDURE — 3008F BODY MASS INDEX DOCD: CPT | Performed by: FAMILY MEDICINE

## 2024-11-29 PROCEDURE — 90480 ADMN SARSCOV2 VAC 1/ONLY CMP: CPT | Performed by: FAMILY MEDICINE

## 2024-11-29 PROCEDURE — 63600000 HC DRUGS/DETAIL CODE: Mod: JZ

## 2024-11-29 PROCEDURE — 96372 THER/PROPH/DIAG INJ SC/IM: CPT | Performed by: FAMILY MEDICINE

## 2024-11-29 PROCEDURE — 99900024 HB NONBILLABLE HOSPITAL CLINIC SERVICE: Mod: 25 | Performed by: FAMILY MEDICINE

## 2024-11-29 PROCEDURE — 3E0236Z INTRODUCTION OF NUTRITIONAL SUBSTANCE INTO MUSCLE, PERCUTANEOUS APPROACH: ICD-10-PCS | Performed by: FAMILY MEDICINE

## 2024-11-29 PROCEDURE — 91322 SARSCOV2 VAC 50 MCG/0.5ML IM: CPT | Performed by: FAMILY MEDICINE

## 2024-11-29 PROCEDURE — 99213 OFFICE O/P EST LOW 20 MIN: CPT | Mod: 25 | Performed by: FAMILY MEDICINE

## 2024-11-29 RX ORDER — CYANOCOBALAMIN 1000 UG/ML
1000 INJECTION, SOLUTION INTRAMUSCULAR; SUBCUTANEOUS ONCE
Status: COMPLETED | OUTPATIENT
Start: 2024-11-29 | End: 2024-11-29

## 2024-11-29 RX ORDER — COLCHICINE 0.6 MG/1
TABLET ORAL
Qty: 10 TABLET | Refills: 0 | Status: SHIPPED | OUTPATIENT
Start: 2024-11-29

## 2024-11-29 RX ADMIN — CYANOCOBALAMIN 1000 MCG: 1000 INJECTION, SOLUTION INTRAMUSCULAR at 11:27

## 2024-11-29 ASSESSMENT — PAIN SCALES - GENERAL: PAINLEVEL_OUTOF10: 0-NO PAIN

## 2024-11-29 NOTE — PROGRESS NOTES
SUBJECTIVE    Rickie Kaminski is a 59 y.o. male who has a history of alcohol use disorder, cognitive impairment, b12 deficiency, fatty liver, breast mass who presents for routine follow up    He comes today as usual with Ina, his wife and primary caregiver    At last visit wasn't eating much  His appetite has come back and he has gained some weight back  He denies any systemic symptoms and tells me he has been feeling well    Had gout flare left hand- seen in ED 8/30/24  Never had gout before  Resolved with colchiciine  No further symptoms    He continues to drink about three 24 ounce cans 8% malt liquor daily  No history of withdrawal seizures, no history of alcohol withdrawal hospitalization  He tells me he is taking his vitamins 2-3 days a week  He is still planning to pursue inpatient treatment for AUD but wants to wait until after the holidays    UTD flu shot   Agrees to covid booster today  UTD colonoscopy    Wt Readings from Last 15 Encounters:   11/29/24 63 kg (139 lb)   08/30/24 72.6 kg (160 lb)   07/24/24 57.6 kg (127 lb)   04/17/24 62.7 kg (138 lb 4.8 oz)   03/04/24 59.7 kg (131 lb 11.2 oz)   01/17/24 63.6 kg (140 lb 3.2 oz)   11/28/23 62.6 kg (138 lb)   11/07/23 63 kg (138 lb 12.8 oz)   10/24/23 59 kg (130 lb)   10/20/23 60.2 kg (132 lb 12.8 oz)   06/13/23 62.1 kg (137 lb)   02/08/23 67 kg (147 lb 12.8 oz)   10/04/22 67.8 kg (149 lb 6.4 oz)   05/04/22 70.9 kg (156 lb 3.2 oz)   01/04/22 65.8 kg (145 lb)     Past Medical History:   Diagnosis Date    Alcoholism (CMS/HCC)     Epilepsy (CMS/HCC)     Memory loss      Social History     Tobacco Use    Smoking status: Never     Passive exposure: Never    Smokeless tobacco: Never   Vaping Use    Vaping status: Never Used   Substance Use Topics    Alcohol use: Yes     Comment: alcoholic    Drug use: Yes     Types: Marijuana     Current Medications:   Current Outpatient Medications:     colchicine (COLCRYS) 0.6 mg tablet, 1.2 mg at the first sign of flare,  "followed by 0.6 mg after 1 hour; maximum total dose: 1.8 mg/day on day 1 continue one tablet daily until flare resolves, Disp: 10 tablet, Rfl: 0    cyanocobalamin (VITAMIN B12) 1,000 mcg tablet, Take 1 tablet (1,000 mcg total) by mouth daily., Disp: 90 tablet, Rfl: 3    dorzolamide-timoloL (COSOPT) 22.3-6.8 mg/mL ophthalmic solution, instill 1 drop in both eyes 2x daily, Disp: , Rfl:     folic acid (FOLVITE) 1 mg tablet, Take 1 tablet (1 mg total) by mouth daily., Disp: 90 tablet, Rfl: 3    therapeutic multivitamin (THERAGRAN) tablet, Take 1 tablet by mouth daily., Disp: 90 tablet, Rfl: 3    thiamine 100 mg tablet, Take 1 tablet (100 mg total) by mouth daily., Disp: 90 tablet, Rfl: 3    triamcinolone (KENALOG) 0.1 % cream, Apply topically 2 (two) times a day., Disp: 30 g, Rfl: 1    Allergies: Crab, Shellfish containing products, and Shellfish derived    Objective   Physical Exam:  Visit Vitals  BP (!) 141/68 (BP Location: Left upper arm, Patient Position: Sitting)   Pulse 72   Temp 36.6 °C (97.9 °F)   Resp 20   Ht 1.676 m (5' 6\")   Wt 63 kg (139 lb)   SpO2 100%   BMI 22.44 kg/m²      Physical Exam  Vitals reviewed.   Constitutional:       Appearance: Normal appearance.   Cardiovascular:      Rate and Rhythm: Normal rate and regular rhythm.   Pulmonary:      Effort: Pulmonary effort is normal.      Breath sounds: Normal breath sounds.   Abdominal:      Palpations: Abdomen is soft.      Tenderness: There is no abdominal tenderness.   Neurological:      Mental Status: He is alert and oriented to person, place, and time.   Psychiatric:         Mood and Affect: Mood normal.         Behavior: Behavior normal.         Thought Content: Thought content normal.         Judgment: Judgment normal.           Lab Results   Component Value Date    CHOL 158 04/17/2024    CHOL 182 09/26/2022    CHOL 196 12/07/2020     Lab Results   Component Value Date     04/17/2024    HDL 93 09/26/2022    HDL 89 12/07/2020     Lab Results "   Component Value Date    LDLCALC 30 04/17/2024    LDLCALC 74 09/26/2022    LDLCALC 91 12/07/2020     Lab Results   Component Value Date    TRIG 89 04/17/2024    TRIG 86 09/26/2022    TRIG 93 12/07/2020       Assessment & Plan:  Problem List Items Addressed This Visit          Unprioritized    Alcohol dependence (CMS/HCC)     Planning to start inpatient treatment after the holidays  He is not taking his vitamins regularly, I will check levels and encouraged he take them more often  Gave b12 shot in office today  Check labs         Relevant Orders    CBC and differential    Comprehensive metabolic panel    Vitamin B1 (Thiamine), Blood    Folate    Protime-INR    Iron and TIBC    Ferritin    B12 deficiency    Relevant Orders    Vitamin B12     Other Visit Diagnoses       Need for COVID-19 vaccine    -  Primary    Relevant Orders    Covid-19 Moderna Spikevax (Completed)              Return in about 4 months (around 3/29/2025).  Rebecca Blakely MD

## 2024-12-01 NOTE — ASSESSMENT & PLAN NOTE
Planning to start inpatient treatment after the holidays  He is not taking his vitamins regularly, I will check levels and encouraged he take them more often  Gave b12 shot in office today  Check labs

## 2025-02-02 ENCOUNTER — HOSPITAL ENCOUNTER (OUTPATIENT)
Dept: SLEEP MEDICINE | Facility: HOSPITAL | Age: 60
Discharge: HOME | End: 2025-02-02
Attending: INTERNAL MEDICINE
Payer: COMMERCIAL

## 2025-02-02 DIAGNOSIS — G47.33 OBSTRUCTIVE SLEEP APNEA (ADULT) (PEDIATRIC): ICD-10-CM

## 2025-02-02 PROCEDURE — 95810 POLYSOM 6/> YRS 4/> PARAM: CPT

## 2025-02-03 VITALS — WEIGHT: 136 LBS | HEIGHT: 67 IN | BODY MASS INDEX: 21.35 KG/M2

## 2025-02-13 ENCOUNTER — HOSPITAL ENCOUNTER (OUTPATIENT)
Dept: RADIOLOGY | Facility: HOSPITAL | Age: 60
Discharge: HOME | End: 2025-02-13
Attending: PODIATRIST
Payer: COMMERCIAL

## 2025-02-13 ENCOUNTER — TRANSCRIBE ORDERS (OUTPATIENT)
Dept: RADIOLOGY | Facility: HOSPITAL | Age: 60
End: 2025-02-13

## 2025-02-13 DIAGNOSIS — R26.89 OTHER ABNORMALITIES OF GAIT AND MOBILITY: ICD-10-CM

## 2025-02-13 DIAGNOSIS — M25.572 PAIN IN LEFT ANKLE AND JOINTS OF LEFT FOOT: ICD-10-CM

## 2025-02-13 DIAGNOSIS — S90.02XA CONTUSION OF LEFT ANKLE, INITIAL ENCOUNTER: ICD-10-CM

## 2025-02-13 DIAGNOSIS — S90.02XA CONTUSION OF LEFT ANKLE, INITIAL ENCOUNTER: Primary | ICD-10-CM

## 2025-02-13 PROCEDURE — 73630 X-RAY EXAM OF FOOT: CPT | Mod: LT

## 2025-03-28 ENCOUNTER — OFFICE VISIT (OUTPATIENT)
Dept: INTERNAL MEDICINE | Facility: HOSPITAL | Age: 60
End: 2025-03-28
Payer: COMMERCIAL

## 2025-03-28 VITALS
WEIGHT: 132 LBS | HEIGHT: 66 IN | BODY MASS INDEX: 21.21 KG/M2 | TEMPERATURE: 98.2 F | HEART RATE: 86 BPM | OXYGEN SATURATION: 98 % | SYSTOLIC BLOOD PRESSURE: 126 MMHG | DIASTOLIC BLOOD PRESSURE: 74 MMHG | RESPIRATION RATE: 20 BRPM

## 2025-03-28 DIAGNOSIS — F10.29 ALCOHOL DEPENDENCE WITH UNSPECIFIED ALCOHOL-INDUCED DISORDER: ICD-10-CM

## 2025-03-28 DIAGNOSIS — E53.8 B12 DEFICIENCY: ICD-10-CM

## 2025-03-28 DIAGNOSIS — R41.3 MEMORY LOSS: Primary | ICD-10-CM

## 2025-03-28 DIAGNOSIS — M79.3 PANNICULITIS: ICD-10-CM

## 2025-03-28 PROBLEM — G47.33 OBSTRUCTIVE SLEEP APNEA: Status: RESOLVED | Noted: 2022-01-04 | Resolved: 2025-03-28

## 2025-03-28 PROBLEM — D70.8 OTHER NEUTROPENIA (CMS/HCC): Status: RESOLVED | Noted: 2023-02-08 | Resolved: 2025-03-28

## 2025-03-28 PROBLEM — R05.1 ACUTE COUGH: Status: RESOLVED | Noted: 2021-03-18 | Resolved: 2025-03-28

## 2025-03-28 PROCEDURE — 99214 OFFICE O/P EST MOD 30 MIN: CPT | Performed by: FAMILY MEDICINE

## 2025-03-28 PROCEDURE — 3008F BODY MASS INDEX DOCD: CPT | Performed by: FAMILY MEDICINE

## 2025-03-28 ASSESSMENT — PAIN SCALES - GENERAL: PAINLEVEL_OUTOF10: 5

## 2025-03-28 ASSESSMENT — PATIENT HEALTH QUESTIONNAIRE - PHQ9: SUM OF ALL RESPONSES TO PHQ9 QUESTIONS 1 & 2: 0

## 2025-03-28 NOTE — ASSESSMENT & PLAN NOTE
Rickie has cut back on his alcohol, which is great  He still wants to do inpatient treatment and I supported him in that plan

## 2025-03-28 NOTE — PATIENT INSTRUCTIONS
Get blood work    Continue your vitamins    Pursue alcohol treatment    Let me know if you need anything!!

## 2025-03-28 NOTE — ASSESSMENT & PLAN NOTE
I asked him to continue his vitamins  I gave him labs to do at last visit but he didn't complete  Reprinted labs slips and he agrees to have them done

## 2025-03-28 NOTE — PROGRESS NOTES
SUBJECTIVE    Rickie Kaminski is a 59 y.o. male who has a history of alcohol use disorder, cognitive impairment, b12 deficiency, fatty liver, breast mass who presents for routine follow up    He comes today as usual with Ina, his wife and primary caregiver    He has no acute concerns today    He has been dealing with ankle issue, he rolled it and is wearing a boot  Following with podiatry for this  Xrays showed no fracture and pain is improving    Had gout flare left hand- seen in ED 8/30/24  Denies recurrence    He is drinking less- two 16 ounce of 8% malt liquor daily- down from from about three 24 ounce cans 8% malt liquor daily  No history of withdrawal seizures, no history of alcohol withdrawal hospitalization  He tells me he is taking his vitamins  He is still planning to pursue inpatient treatment for AUD   He says he still wants to, has resources and his wife will help him engage with treatment    UTD flu shot   UTD covid booster   UTD colonoscopy    Wt Readings from Last 15 Encounters:   03/28/25 59.9 kg (132 lb)   02/03/25 61.7 kg (136 lb)   11/29/24 63 kg (139 lb)   08/30/24 72.6 kg (160 lb)   07/24/24 57.6 kg (127 lb)   04/17/24 62.7 kg (138 lb 4.8 oz)   03/04/24 59.7 kg (131 lb 11.2 oz)   01/17/24 63.6 kg (140 lb 3.2 oz)   11/28/23 62.6 kg (138 lb)   11/07/23 63 kg (138 lb 12.8 oz)   10/24/23 59 kg (130 lb)   10/20/23 60.2 kg (132 lb 12.8 oz)   06/13/23 62.1 kg (137 lb)   02/08/23 67 kg (147 lb 12.8 oz)   10/04/22 67.8 kg (149 lb 6.4 oz)     Past Medical History:   Diagnosis Date    Alcoholism (CMS/HCC)     Epilepsy (CMS/HCC)     Memory loss      Social History     Tobacco Use    Smoking status: Never     Passive exposure: Never    Smokeless tobacco: Never   Vaping Use    Vaping status: Never Used   Substance Use Topics    Alcohol use: Yes     Comment: alcoholic    Drug use: Yes     Types: Marijuana     Current Medications:   Current Outpatient Medications:     cyanocobalamin (VITAMIN B12) 1,000 mcg  "tablet, Take 1 tablet (1,000 mcg total) by mouth daily., Disp: 90 tablet, Rfl: 3    folic acid (FOLVITE) 1 mg tablet, Take 1 tablet (1 mg total) by mouth daily., Disp: 90 tablet, Rfl: 3    therapeutic multivitamin (THERAGRAN) tablet, Take 1 tablet by mouth daily., Disp: 90 tablet, Rfl: 3    thiamine 100 mg tablet, Take 1 tablet (100 mg total) by mouth daily., Disp: 90 tablet, Rfl: 3    colchicine (COLCRYS) 0.6 mg tablet, 1.2 mg at the first sign of flare, followed by 0.6 mg after 1 hour; maximum total dose: 1.8 mg/day on day 1 continue one tablet daily until flare resolves, Disp: 10 tablet, Rfl: 0    dorzolamide-timoloL (COSOPT) 22.3-6.8 mg/mL ophthalmic solution, instill 1 drop in both eyes 2x daily, Disp: , Rfl:     triamcinolone (KENALOG) 0.1 % cream, Apply topically 2 (two) times a day., Disp: 30 g, Rfl: 1    Allergies: Crab, Shellfish containing products, and Shellfish derived    Objective   Physical Exam:  Visit Vitals  /74   Pulse 86   Temp 36.8 °C (98.2 °F) (Temporal)   Resp 20   Ht 1.676 m (5' 6\")   Wt 59.9 kg (132 lb)   SpO2 98%   BMI 21.31 kg/m²        Physical Exam  Constitutional:       Appearance: Normal appearance.   Eyes:      General: No scleral icterus.  Cardiovascular:      Rate and Rhythm: Normal rate and regular rhythm.   Pulmonary:      Effort: Pulmonary effort is normal.      Breath sounds: Normal breath sounds.   Abdominal:      General: There is no distension.      Palpations: Abdomen is soft.      Tenderness: There is no abdominal tenderness.   Skin:     Comments: Firm left breast skin, unchanged from prior   Neurological:      Mental Status: He is alert and oriented to person, place, and time.   Psychiatric:         Mood and Affect: Mood normal.         Behavior: Behavior normal.         Thought Content: Thought content normal.         Judgment: Judgment normal.         Lab Results   Component Value Date    CHOL 158 04/17/2024    CHOL 182 09/26/2022    CHOL 196 12/07/2020     Lab " Results   Component Value Date     04/17/2024    HDL 93 09/26/2022    HDL 89 12/07/2020     Lab Results   Component Value Date    LDLCALC 30 04/17/2024    LDLCALC 74 09/26/2022    LDLCALC 91 12/07/2020     Lab Results   Component Value Date    TRIG 89 04/17/2024    TRIG 86 09/26/2022    TRIG 93 12/07/2020       Assessment & Plan  Alcohol dependence with unspecified alcohol-induced disorder  Rickie has cut back on his alcohol, which is great  He still wants to do inpatient treatment and I supported him in that plan         Memory loss  Has seen neurology at [place]  Likely related to alcohol   stable       B12 deficiency  I asked him to continue his vitamins  I gave him labs to do at last visit but he didn't complete  Reprinted labs slips and he agrees to have them done       Panniculitis  Has a panniculitis lesion on left breast  Unchanged               Return in about 6 months (around 9/28/2025).  Rebecca Blakely MD

## 2025-04-17 ENCOUNTER — TELEPHONE (OUTPATIENT)
Dept: INTERNAL MEDICINE | Facility: HOSPITAL | Age: 60
End: 2025-04-17
Payer: COMMERCIAL

## 2025-04-17 NOTE — TELEPHONE ENCOUNTER
[facility] Sleep Office, Visit Note, Scanned

## (undated) DEVICE — SOLN IV 0.9% NSS 1000ML